# Patient Record
Sex: FEMALE | Race: OTHER | HISPANIC OR LATINO
[De-identification: names, ages, dates, MRNs, and addresses within clinical notes are randomized per-mention and may not be internally consistent; named-entity substitution may affect disease eponyms.]

---

## 2017-07-21 ENCOUNTER — APPOINTMENT (OUTPATIENT)
Dept: OBGYN | Facility: CLINIC | Age: 27
End: 2017-07-21

## 2017-07-27 ENCOUNTER — APPOINTMENT (OUTPATIENT)
Dept: INTERNAL MEDICINE | Facility: CLINIC | Age: 27
End: 2017-07-27

## 2017-08-03 ENCOUNTER — APPOINTMENT (OUTPATIENT)
Dept: OBGYN | Facility: CLINIC | Age: 27
End: 2017-08-03
Payer: COMMERCIAL

## 2017-08-03 PROCEDURE — 99395 PREV VISIT EST AGE 18-39: CPT

## 2017-08-03 PROCEDURE — 36415 COLL VENOUS BLD VENIPUNCTURE: CPT

## 2017-08-17 ENCOUNTER — APPOINTMENT (OUTPATIENT)
Dept: INTERNAL MEDICINE | Facility: CLINIC | Age: 27
End: 2017-08-17

## 2017-10-11 ENCOUNTER — LABORATORY RESULT (OUTPATIENT)
Age: 27
End: 2017-10-11

## 2017-10-12 ENCOUNTER — APPOINTMENT (OUTPATIENT)
Dept: INTERNAL MEDICINE | Facility: CLINIC | Age: 27
End: 2017-10-12
Payer: COMMERCIAL

## 2017-10-12 ENCOUNTER — FORM ENCOUNTER (OUTPATIENT)
Age: 27
End: 2017-10-12

## 2017-10-12 VITALS
OXYGEN SATURATION: 99 % | HEART RATE: 72 BPM | WEIGHT: 161 LBS | SYSTOLIC BLOOD PRESSURE: 110 MMHG | DIASTOLIC BLOOD PRESSURE: 70 MMHG | BODY MASS INDEX: 28.52 KG/M2

## 2017-10-12 DIAGNOSIS — K90.0 CELIAC DISEASE: ICD-10-CM

## 2017-10-12 PROCEDURE — 99385 PREV VISIT NEW AGE 18-39: CPT

## 2017-10-13 ENCOUNTER — APPOINTMENT (OUTPATIENT)
Dept: RADIOLOGY | Facility: CLINIC | Age: 27
End: 2017-10-13
Payer: COMMERCIAL

## 2017-10-13 ENCOUNTER — OUTPATIENT (OUTPATIENT)
Dept: OUTPATIENT SERVICES | Facility: HOSPITAL | Age: 27
LOS: 1 days | End: 2017-10-13
Payer: OTHER GOVERNMENT

## 2017-10-13 DIAGNOSIS — M79.669 PAIN IN UNSPECIFIED LOWER LEG: ICD-10-CM

## 2017-10-13 DIAGNOSIS — A60.09 HERPESVIRAL INFECTION OF OTHER UROGENITAL TRACT: ICD-10-CM

## 2017-10-13 PROCEDURE — 73590 X-RAY EXAM OF LOWER LEG: CPT | Mod: 26,50

## 2017-10-13 PROCEDURE — 73590 X-RAY EXAM OF LOWER LEG: CPT

## 2017-10-13 PROCEDURE — 73110 X-RAY EXAM OF WRIST: CPT

## 2017-10-13 PROCEDURE — 73110 X-RAY EXAM OF WRIST: CPT | Mod: 26,RT

## 2017-10-14 ENCOUNTER — TRANSCRIPTION ENCOUNTER (OUTPATIENT)
Age: 27
End: 2017-10-14

## 2017-10-16 ENCOUNTER — TRANSCRIPTION ENCOUNTER (OUTPATIENT)
Age: 27
End: 2017-10-16

## 2017-10-16 LAB
25(OH)D3 SERPL-MCNC: 15.4 NG/ML
ALBUMIN SERPL ELPH-MCNC: 4.9 G/DL
ALP BLD-CCNC: 109 U/L
ALT SERPL-CCNC: 24 U/L
ANION GAP SERPL CALC-SCNC: 16 MMOL/L
AST SERPL-CCNC: 24 U/L
BASOPHILS # BLD AUTO: 0.02 K/UL
BASOPHILS NFR BLD AUTO: 0.3 %
BILIRUB SERPL-MCNC: 0.4 MG/DL
BUN SERPL-MCNC: 10 MG/DL
CALCIUM SERPL-MCNC: 9.8 MG/DL
CHLORIDE SERPL-SCNC: 103 MMOL/L
CHOLEST SERPL-MCNC: 181 MG/DL
CHOLEST/HDLC SERPL: 3.4 RATIO
CO2 SERPL-SCNC: 23 MMOL/L
CREAT SERPL-MCNC: 0.91 MG/DL
ENDOMYSIUM IGA SER QL: NEGATIVE
ENDOMYSIUM IGA TITR SER: NORMAL
EOSINOPHIL # BLD AUTO: 0.52 K/UL
EOSINOPHIL NFR BLD AUTO: 7 %
GLIADIN IGA SER QL: 6.7 UNITS
GLIADIN IGG SER QL: 6.3 UNITS
GLIADIN PEPTIDE IGA SER-ACNC: NEGATIVE
GLIADIN PEPTIDE IGG SER-ACNC: NEGATIVE
GLUCOSE SERPL-MCNC: 84 MG/DL
HBA1C MFR BLD HPLC: 5.3 %
HCT VFR BLD CALC: 44.8 %
HDLC SERPL-MCNC: 53 MG/DL
HGB BLD-MCNC: 14.2 G/DL
HIV1+2 AB SPEC QL IA.RAPID: NONREACTIVE
HSV 1+2 IGG SER IA-IMP: POSITIVE
HSV 1+2 IGG SER IA-IMP: POSITIVE
HSV1 IGG SER QL: 41.5 INDEX
HSV2 IGG SER QL: 5.58 INDEX
IGA SER QL IEP: 248 MG/DL
IMM GRANULOCYTES NFR BLD AUTO: 0.1 %
LDLC SERPL CALC-MCNC: 95 MG/DL
LYMPHOCYTES # BLD AUTO: 1.76 K/UL
LYMPHOCYTES NFR BLD AUTO: 23.6 %
MAN DIFF?: NORMAL
MCHC RBC-ENTMCNC: 31.7 GM/DL
MCHC RBC-ENTMCNC: 32.3 PG
MCV RBC AUTO: 102.1 FL
MONOCYTES # BLD AUTO: 0.43 K/UL
MONOCYTES NFR BLD AUTO: 5.8 %
NEUTROPHILS # BLD AUTO: 4.71 K/UL
NEUTROPHILS NFR BLD AUTO: 63.2 %
PLATELET # BLD AUTO: 262 K/UL
POTASSIUM SERPL-SCNC: 4.1 MMOL/L
PROT SERPL-MCNC: 7.7 G/DL
RBC # BLD: 4.39 M/UL
RBC # FLD: 14 %
SODIUM SERPL-SCNC: 142 MMOL/L
T PALLIDUM AB SER QL IA: NEGATIVE
TRIGL SERPL-MCNC: 165 MG/DL
TSH SERPL-ACNC: 0.77 UIU/ML
TTG IGA SER IA-ACNC: 6.4 UNITS
TTG IGA SER-ACNC: NEGATIVE
TTG IGG SER IA-ACNC: <5 UNITS
TTG IGG SER IA-ACNC: NEGATIVE
WBC # FLD AUTO: 7.45 K/UL

## 2017-10-17 ENCOUNTER — TRANSCRIPTION ENCOUNTER (OUTPATIENT)
Age: 27
End: 2017-10-17

## 2017-10-23 ENCOUNTER — APPOINTMENT (OUTPATIENT)
Dept: ORTHOPEDIC SURGERY | Facility: CLINIC | Age: 27
End: 2017-10-23
Payer: COMMERCIAL

## 2017-10-23 VITALS
SYSTOLIC BLOOD PRESSURE: 101 MMHG | DIASTOLIC BLOOD PRESSURE: 66 MMHG | BODY MASS INDEX: 28.53 KG/M2 | HEIGHT: 63 IN | WEIGHT: 161 LBS | HEART RATE: 57 BPM

## 2017-10-23 DIAGNOSIS — Z78.9 OTHER SPECIFIED HEALTH STATUS: ICD-10-CM

## 2017-10-23 DIAGNOSIS — Z60.2 PROBLEMS RELATED TO LIVING ALONE: ICD-10-CM

## 2017-10-23 DIAGNOSIS — Z80.0 FAMILY HISTORY OF MALIGNANT NEOPLASM OF DIGESTIVE ORGANS: ICD-10-CM

## 2017-10-23 PROCEDURE — 99203 OFFICE O/P NEW LOW 30 MIN: CPT

## 2017-10-23 SDOH — SOCIAL STABILITY - SOCIAL INSECURITY: PROBLEMS RELATED TO LIVING ALONE: Z60.2

## 2017-11-02 ENCOUNTER — APPOINTMENT (OUTPATIENT)
Dept: MRI IMAGING | Facility: CLINIC | Age: 27
End: 2017-11-02

## 2017-11-02 ENCOUNTER — OUTPATIENT (OUTPATIENT)
Dept: OUTPATIENT SERVICES | Facility: HOSPITAL | Age: 27
LOS: 1 days | End: 2017-11-02
Payer: OTHER GOVERNMENT

## 2017-11-02 DIAGNOSIS — Z00.8 ENCOUNTER FOR OTHER GENERAL EXAMINATION: ICD-10-CM

## 2017-11-02 PROCEDURE — 73718 MRI LOWER EXTREMITY W/O DYE: CPT | Mod: 26,RT

## 2017-11-02 PROCEDURE — 73718 MRI LOWER EXTREMITY W/O DYE: CPT

## 2019-03-13 ENCOUNTER — RESULT REVIEW (OUTPATIENT)
Age: 29
End: 2019-03-13

## 2019-03-13 ENCOUNTER — APPOINTMENT (OUTPATIENT)
Dept: INTERNAL MEDICINE | Facility: CLINIC | Age: 29
End: 2019-03-13

## 2019-03-13 ENCOUNTER — APPOINTMENT (OUTPATIENT)
Dept: OBGYN | Facility: CLINIC | Age: 29
End: 2019-03-13
Payer: OTHER GOVERNMENT

## 2019-03-13 PROCEDURE — 99213 OFFICE O/P EST LOW 20 MIN: CPT | Mod: 25

## 2019-03-13 PROCEDURE — 99395 PREV VISIT EST AGE 18-39: CPT

## 2019-03-14 ENCOUNTER — APPOINTMENT (OUTPATIENT)
Dept: OBGYN | Facility: CLINIC | Age: 29
End: 2019-03-14
Payer: OTHER GOVERNMENT

## 2019-03-14 PROCEDURE — 36415 COLL VENOUS BLD VENIPUNCTURE: CPT

## 2019-03-27 ENCOUNTER — APPOINTMENT (OUTPATIENT)
Dept: INTERNAL MEDICINE | Facility: CLINIC | Age: 29
End: 2019-03-27
Payer: OTHER GOVERNMENT

## 2019-03-27 VITALS
DIASTOLIC BLOOD PRESSURE: 60 MMHG | BODY MASS INDEX: 28.88 KG/M2 | HEIGHT: 63 IN | SYSTOLIC BLOOD PRESSURE: 98 MMHG | HEART RATE: 69 BPM | OXYGEN SATURATION: 98 % | WEIGHT: 163 LBS

## 2019-03-27 PROCEDURE — G0444 DEPRESSION SCREEN ANNUAL: CPT | Mod: 59

## 2019-03-27 PROCEDURE — 99395 PREV VISIT EST AGE 18-39: CPT

## 2019-03-27 NOTE — PHYSICAL EXAM
[No Acute Distress] : no acute distress [Well-Appearing] : well-appearing [Normal Sclera/Conjunctiva] : normal sclera/conjunctiva [PERRL] : pupils equal round and reactive to light [EOMI] : extraocular movements intact [Normal Oropharynx] : the oropharynx was normal [Supple] : supple [No Lymphadenopathy] : no lymphadenopathy [Thyroid Normal, No Nodules] : the thyroid was normal and there were no nodules present [No Respiratory Distress] : no respiratory distress  [Clear to Auscultation] : lungs were clear to auscultation bilaterally [No Accessory Muscle Use] : no accessory muscle use [Normal Rate] : normal rate  [Regular Rhythm] : with a regular rhythm [Normal S1, S2] : normal S1 and S2 [No Murmur] : no murmur heard [No Edema] : there was no peripheral edema [Declined Breast Exam] : declined breast exam  [No Axillary Lymphadenopathy] : no axillary lymphadenopathy [Soft] : abdomen soft [Non Tender] : non-tender [No HSM] : no HSM [Normal Bowel Sounds] : normal bowel sounds [Normal Supraclavicular Nodes] : no supraclavicular lymphadenopathy [Normal Axillary Nodes] : no axillary lymphadenopathy [Normal Posterior Cervical Nodes] : no posterior cervical lymphadenopathy [Normal Anterior Cervical Nodes] : no anterior cervical lymphadenopathy [Normal Inguinal Nodes] : no inguinal lymphadenopathy [No Spinal Tenderness] : no spinal tenderness [No Joint Swelling] : no joint swelling [No Focal Deficits] : no focal deficits [Speech Grossly Normal] : speech grossly normal [Normal Affect] : the affect was normal [Alert and Oriented x3] : oriented to person, place, and time [Normal Mood] : the mood was normal [Normal Insight/Judgement] : insight and judgment were intact [Acne] : no acne [de-identified] : No calf tenderness bilaterally. Radial pulses +2 bilaterally  [de-identified] : pt states she will see her GYN for her breast symptoms

## 2019-03-27 NOTE — ASSESSMENT
[FreeTextEntry1] : HCM - preventive topics d/w pt\par -check screening labs\par \par Advised the patient today to return to the office within 12 months or sooner as needed for the next visit and that the patient should see any doctor here at this office for ongoing care

## 2019-03-27 NOTE — REVIEW OF SYSTEMS
[Fever] : no fever [Chills] : no chills [Pain] : no pain [Redness] : no redness [Sore Throat] : no sore throat [Chest Pain] : no chest pain [Shortness Of Breath] : no shortness of breath [Abdominal Pain] : no abdominal pain [Melena] : no melena [Dysuria] : no dysuria [Hematuria] : no hematuria [Joint Pain] : no joint pain [Skin Rash] : no skin rash [Dizziness] : no dizziness [Fainting] : no fainting [Depression] : no depression [Easy Bleeding] : no easy bleeding [Easy Bruising] : no easy bruising [FreeTextEntry8] : no abnormal vaginal bleeding [de-identified] : no breast pain but pt reported milky nipple discharge - she saw her GYN for it and they are doing an ultrasound and bloodwork was "normal" per pt for it - advised pt to do the testing and f/u with her GYN For it

## 2019-03-27 NOTE — HEALTH RISK ASSESSMENT
[No falls in past year] : Patient reported no falls in the past year [0] : 2) Feeling down, depressed, or hopeless: Not at all (0) [Patient reported PAP Smear was normal] : Patient reported PAP Smear was normal [HIV Test offered] : HIV Test offered [] :  [Sexually Active] : sexually active [Feels Safe at Home] : Feels safe at home [Fully functional (bathing, dressing, toileting, transferring, walking, feeding)] : Fully functional (bathing, dressing, toileting, transferring, walking, feeding) [Fully functional (using the telephone, shopping, preparing meals, housekeeping, doing laundry, using] : Fully functional and needs no help or supervision to perform IADLs (using the telephone, shopping, preparing meals, housekeeping, doing laundry, using transportation, managing medications and managing finances) [Smoke Detector] : smoke detector [Carbon Monoxide Detector] : carbon monoxide detector [Seat Belt] :  uses seat belt [Sunscreen] : uses sunscreen [] : No [de-identified] : social [[id number]] : 0 [Reports changes in hearing] : Reports no changes in hearing [Reports changes in vision] : Reports no changes in vision [Reports changes in dental health] : Reports no changes in dental health [PapSmearDate] : 03/19 [PapSmearComments] : pt reports she sees her GYN regularly [de-identified] : 1 female partner [de-identified] : advised routine dental followup

## 2019-03-27 NOTE — HISTORY OF PRESENT ILLNESS
[FreeTextEntry1] : CPE [de-identified] : \par Pt reports feeling well overall.  Pt was deployed to [facility] - she is in the  and recently returned this past February. No major issues/incidents occurred per the pt.

## 2019-03-27 NOTE — COUNSELING
[Healthy eating counseling provided] : healthy eating [Activity counseling provided] : activity [None] : None [Good understanding] : Patient has a good understanding of lifestyle changes and the steps needed to achieve self management goals [ - Annual Depression Screening] : Annual Depression Screening [de-identified] : pt counseled on eating a healthy diet, exercise, avoidance of tobacco and alcohol, avoidance of UV light/sunscreen use, safe sex habits/STI prevention, pt agreeable to HIV/STI screening which was offered today, pt counseled to alert us or GYN immediately if she ever has abnormal vaginal bleeding\par \par HCM - preventive topics d/w pt. pt agreeable to HIV screening which was offered today. \par \par Pt advised to call us/come here for a sick visit/or go to urgent care if she is ill. pt advised to go to ED if emergent issues. pt is to call us to discuss results of all lab testing and to discuss any appropriate followup. Pt advised to alert us if she does not receive a phone call or letter with lab test results within 10 days  of today's visit.\par

## 2019-07-16 ENCOUNTER — TRANSCRIPTION ENCOUNTER (OUTPATIENT)
Age: 29
End: 2019-07-16

## 2019-08-22 ENCOUNTER — APPOINTMENT (OUTPATIENT)
Dept: INTERNAL MEDICINE | Facility: CLINIC | Age: 29
End: 2019-08-22
Payer: OTHER GOVERNMENT

## 2019-08-22 VITALS
DIASTOLIC BLOOD PRESSURE: 60 MMHG | SYSTOLIC BLOOD PRESSURE: 100 MMHG | HEIGHT: 63 IN | OXYGEN SATURATION: 97 % | HEART RATE: 62 BPM | BODY MASS INDEX: 30.48 KG/M2 | WEIGHT: 172 LBS

## 2019-08-22 PROCEDURE — 99214 OFFICE O/P EST MOD 30 MIN: CPT

## 2019-08-22 NOTE — PHYSICAL EXAM
[Normal] : normal rate, regular rhythm, normal S1 and S2 and no murmur heard [Normal Posterior Cervical Nodes] : no posterior cervical lymphadenopathy [No CVA Tenderness] : no CVA  tenderness [Normal Anterior Cervical Nodes] : no anterior cervical lymphadenopathy [No Rash] : no rash [No Joint Swelling] : no joint swelling [No Skin Lesions] : no skin lesions [de-identified] : no lesions currently present

## 2019-08-22 NOTE — HISTORY OF PRESENT ILLNESS
[FreeTextEntry1] : rash [de-identified] : Acute visit for patient previously see Dr Elliott.  [place] in a rash - went to local hospital and was treated with steroid.\par She back home, she has had several episodes.  thinks may be triggered by dairy. rash hive-like, will go away with Benadryl - 50 mg - but the Benadryl makes her really tired. If she doesn’t take it, the rash lasts all day. No lip or facial swelling (but does get hives on her face), no itching in her throat, no SOB, no joint pains, no fever. \par Rash not currently present but she has multiple pictures on her phone of what it looks like. \par \par Also c/o weight gain and would like to see a nutritionist

## 2019-09-06 ENCOUNTER — APPOINTMENT (OUTPATIENT)
Dept: INTERNAL MEDICINE | Facility: CLINIC | Age: 29
End: 2019-09-06
Payer: OTHER GOVERNMENT

## 2019-09-06 PROCEDURE — 97802 MEDICAL NUTRITION INDIV IN: CPT

## 2019-09-13 ENCOUNTER — APPOINTMENT (OUTPATIENT)
Dept: INTERNAL MEDICINE | Facility: CLINIC | Age: 29
End: 2019-09-13
Payer: OTHER GOVERNMENT

## 2019-09-13 PROCEDURE — 97803 MED NUTRITION INDIV SUBSEQ: CPT

## 2019-09-26 ENCOUNTER — APPOINTMENT (OUTPATIENT)
Dept: PEDIATRIC ALLERGY IMMUNOLOGY | Facility: CLINIC | Age: 29
End: 2019-09-26
Payer: OTHER GOVERNMENT

## 2019-09-26 ENCOUNTER — APPOINTMENT (OUTPATIENT)
Dept: INTERNAL MEDICINE | Facility: CLINIC | Age: 29
End: 2019-09-26
Payer: OTHER GOVERNMENT

## 2019-09-26 VITALS
HEIGHT: 63 IN | SYSTOLIC BLOOD PRESSURE: 98 MMHG | OXYGEN SATURATION: 98 % | WEIGHT: 175 LBS | HEART RATE: 54 BPM | BODY MASS INDEX: 31.01 KG/M2 | DIASTOLIC BLOOD PRESSURE: 65 MMHG

## 2019-09-26 DIAGNOSIS — J30.81 ALLERGIC RHINITIS DUE TO ANIMAL (CAT) (DOG) HAIR AND DANDER: ICD-10-CM

## 2019-09-26 DIAGNOSIS — J30.89 OTHER ALLERGIC RHINITIS: ICD-10-CM

## 2019-09-26 DIAGNOSIS — H10.10 ACUTE ATOPIC CONJUNCTIVITIS, UNSPECIFIED EYE: ICD-10-CM

## 2019-09-26 DIAGNOSIS — K90.49 MALABSORPTION DUE TO INTOLERANCE, NOT ELSEWHERE CLASSIFIED: ICD-10-CM

## 2019-09-26 PROCEDURE — 95004 PERQ TESTS W/ALRGNC XTRCS: CPT

## 2019-09-26 PROCEDURE — 97803 MED NUTRITION INDIV SUBSEQ: CPT

## 2019-09-26 PROCEDURE — 99204 OFFICE O/P NEW MOD 45 MIN: CPT | Mod: 25

## 2019-09-26 NOTE — SOCIAL HISTORY
[Living Area] : in living area [Bedroom] :  in bedroom [Other___] : [unfilled] [de-identified] : son, daughter [FreeTextEntry2] : in  [Smokers in Household] : there are no smokers in the home

## 2019-09-26 NOTE — PHYSICAL EXAM
[Alert] : alert [Well Nourished] : well nourished [Healthy Appearance] : healthy appearance [No Acute Distress] : no acute distress [Well Developed] : well developed [Normal Pupil & Iris Size/Symmetry] : normal pupil and iris size and symmetry [No Discharge] : no discharge [No Photophobia] : no photophobia [Sclera Not Icteric] : sclera not icteric [Suborbital Bogginess] : suborbital bogginess (allergic shiners) [Normal TMs] : both tympanic membranes were normal [Normal Lips/Tongue] : the lips and tongue were normal [Normal Nasal Mucosa] : the nasal mucosa was normal [Normal Outer Ear/Nose] : the ears and nose were normal in appearance [Normal Tonsils] : normal tonsils [No Thrush] : no thrush [No Oral Lesions or Ulcers] : no oral lesions or ulcers [Normal Dentition] : normal dentition [Boggy Nasal Turbinates] : boggy and/or pale nasal turbinates [No Neck Mass] : no neck mass was observed [No LAD] : no lymphadenopathy [Supple] : the neck was supple [Normal Rate and Effort] : normal respiratory rhythm and effort [No Crackles] : no crackles [No Retractions] : no retractions [Bilateral Audible Breath Sounds] : bilateral audible breath sounds [Normal Rate] : heart rate was normal  [Normal S1, S2] : normal S1 and S2 [No murmur] : no murmur [Regular Rhythm] : with a regular rhythm [Soft] : abdomen soft [Not Tender] : non-tender [Not Distended] : not distended [Normal Cervical Lymph Nodes] : cervical [No HSM] : no hepato-splenomegaly [Normal Axillary Lumph Nodes] : axillary [Skin Intact] : skin intact  [No Rash] : no rash [No Skin Lesions] : no skin lesions [No Joint Swelling or Erythema] : no joint swelling or erythema [No Edema] : no edema [No clubbing] : no clubbing [No Cyanosis] : no cyanosis [Normal Mood] : mood was normal [Normal Affect] : affect was normal [Alert, Awake, Oriented as Age-Appropriate] : alert, awake, oriented as age appropriate [Conjunctival Erythema] : no conjunctival erythema [Pharyngeal erythema] : no pharyngeal erythema [Clear Rhinorrhea] : no clear rhinorrhea was seen [Wheezing] : no wheezing was heard [Eczematous Patches] : no eczematous patches [Xerosis] : no xerosis

## 2019-09-26 NOTE — REVIEW OF SYSTEMS
[Eye Itching] : itchy eyes [Rhinorrhea] : rhinorrhea [Post Nasal Drip] : post nasal drip [Urticaria] : urticaria [Nl] : Genitourinary [Immunizations are up to date] : Immunizations are up to date

## 2019-09-26 NOTE — CONSULT LETTER
[Consult Letter:] : I had the pleasure of evaluating your patient, [unfilled]. [Please see my note below.] : Please see my note below. [Dear  ___] : Dear  [unfilled], [Consult Closing:] : Thank you very much for allowing me to participate in the care of this patient.  If you have any questions, please do not hesitate to contact me. [Sincerely,] : Sincerely, [FreeTextEntry3] : Rosina Henson MD\par Attending Physician, Division of Allergy and Immunology\par , Departments of Medicine and Pediatrics\par [employer] [facility]\par [facility] \par [facility]  [FreeTextEntry2] : Dr. Brit Casey,

## 2019-09-26 NOTE — IMPRESSION
[Allergy Testing Dust Mite] : dust mites [Allergy Testing Cat] : cat [Allergy Testing Mixed Feathers] : feathers [Allergy Testing Cockroach] : cockroach [Allergy Testing Dog] : dog [] : molds [Allergy Testing Trees] : trees [Allergy Testing Weeds] : weeds [Allergy Testing Grasses] : grasses [________] : [unfilled]

## 2019-09-26 NOTE — REASON FOR VISIT
[Initial Consultation] : an initial consultation for [FreeTextEntry2] : urticaria, chronic rhinitis, postnasal drip, itchy eyes

## 2019-09-26 NOTE — HISTORY OF PRESENT ILLNESS
[Asthma] : asthma [Eczematous rashes] : eczematous rashes [Venom Reactions] : venom reactions [de-identified] : 28 year old female presents in initial consultation for chronic rhinitis, postnasal drip, itchy eyes and urticaria history:\par \par Patient was getting wheals end of July until 2 weeks ago. She was having urticaria on a daily basis until it resolved 2 weeks ago. Urticaria was lessening in intensity before it resolved. Benadryl provided temporary relief of the itch. Additionally she also received steroids at the end of July. She had not been treated with steroids since the end of July.\par 2 weeks prior to symptom onset patient had a UTI, and finished a course of Abx one week prior.\par Urticaria was not associated with joint pain/swelling, fever or mucosal lesions. \par \par The patient tolerates lactose free cow' milk/dairy, egg, wheat, peanut, tree nuts, fish and shellfish with no adverse reaction. Reports h/o bloating and upset stomach with soy and regular milk. Denies associated symptoms of angioedema, hives, or respiratory symptoms with soy or milk.\par \par Patient reports symptoms of nasal congestion, rhinorrhea, sneezing and itching of the eyes. Symptoms are reportedly worse when she cleans her home. Patient takes Zyrtec as needed with some relief of symptoms. She also has eye drops and nose sprays available. \par \par Patient reports h/o vomiting with Percocet and Vicodin on separate occasions. Tolerates Tylenol with no issues.

## 2019-10-17 ENCOUNTER — APPOINTMENT (OUTPATIENT)
Dept: INTERNAL MEDICINE | Facility: CLINIC | Age: 29
End: 2019-10-17

## 2019-10-24 ENCOUNTER — FORM ENCOUNTER (OUTPATIENT)
Age: 29
End: 2019-10-24

## 2019-10-25 ENCOUNTER — APPOINTMENT (OUTPATIENT)
Dept: OBGYN | Facility: CLINIC | Age: 29
End: 2019-10-25
Payer: OTHER GOVERNMENT

## 2019-10-25 PROCEDURE — 99213 OFFICE O/P EST LOW 20 MIN: CPT

## 2019-10-25 PROCEDURE — 36415 COLL VENOUS BLD VENIPUNCTURE: CPT

## 2019-10-27 ENCOUNTER — FORM ENCOUNTER (OUTPATIENT)
Age: 29
End: 2019-10-27

## 2019-10-29 ENCOUNTER — FORM ENCOUNTER (OUTPATIENT)
Age: 29
End: 2019-10-29

## 2019-11-08 ENCOUNTER — APPOINTMENT (OUTPATIENT)
Dept: OBGYN | Facility: CLINIC | Age: 29
End: 2019-11-08

## 2019-11-20 ENCOUNTER — FORM ENCOUNTER (OUTPATIENT)
Age: 29
End: 2019-11-20

## 2020-01-02 ENCOUNTER — TRANSCRIPTION ENCOUNTER (OUTPATIENT)
Age: 30
End: 2020-01-02

## 2020-05-12 ENCOUNTER — TRANSCRIPTION ENCOUNTER (OUTPATIENT)
Age: 30
End: 2020-05-12

## 2020-05-12 ENCOUNTER — APPOINTMENT (OUTPATIENT)
Dept: INTERNAL MEDICINE | Facility: CLINIC | Age: 30
End: 2020-05-12
Payer: OTHER GOVERNMENT

## 2020-05-12 VITALS — WEIGHT: 173 LBS | BODY MASS INDEX: 30.65 KG/M2

## 2020-05-12 PROCEDURE — 99213 OFFICE O/P EST LOW 20 MIN: CPT | Mod: 95

## 2020-05-12 RX ORDER — CETIRIZINE HYDROCHLORIDE 10 MG/1
10 TABLET, COATED ORAL
Qty: 1 | Refills: 1 | Status: DISCONTINUED | COMMUNITY
End: 2020-05-12

## 2020-05-12 RX ORDER — KETOTIFEN FUMARATE 0.25 MG/ML
0.03 SOLUTION/ DROPS OPHTHALMIC
Qty: 1 | Refills: 3 | Status: DISCONTINUED | COMMUNITY
Start: 2019-09-26 | End: 2020-05-12

## 2020-05-12 RX ORDER — FLUTICASONE PROPIONATE 50 UG/1
50 SPRAY, METERED NASAL DAILY
Qty: 1 | Refills: 2 | Status: DISCONTINUED | COMMUNITY
End: 2020-05-12

## 2020-05-13 ENCOUNTER — TRANSCRIPTION ENCOUNTER (OUTPATIENT)
Age: 30
End: 2020-05-13

## 2020-05-14 ENCOUNTER — TRANSCRIPTION ENCOUNTER (OUTPATIENT)
Age: 30
End: 2020-05-14

## 2020-05-28 LAB
17OHP SERPL-MCNC: 51 NG/DL
25(OH)D3 SERPL-MCNC: 18.6 NG/ML
ALBUMIN SERPL ELPH-MCNC: 4.7 G/DL
ALP BLD-CCNC: 104 U/L
ALT SERPL-CCNC: 14 U/L
ANION GAP SERPL CALC-SCNC: 13 MMOL/L
AST SERPL-CCNC: 23 U/L
BASOPHILS # BLD AUTO: 0.02 K/UL
BASOPHILS NFR BLD AUTO: 0.3 %
BILIRUB SERPL-MCNC: 0.4 MG/DL
BUN SERPL-MCNC: 21 MG/DL
CALCIUM SERPL-MCNC: 9.9 MG/DL
CHLORIDE SERPL-SCNC: 104 MMOL/L
CHOLEST SERPL-MCNC: 193 MG/DL
CHOLEST/HDLC SERPL: 3.1 RATIO
CO2 SERPL-SCNC: 24 MMOL/L
CREAT SERPL-MCNC: 0.94 MG/DL
DHEA-SULFATE, SERUM: 86 UG/DL
EOSINOPHIL # BLD AUTO: 0.18 K/UL
EOSINOPHIL NFR BLD AUTO: 2.9 %
ESTRADIOL SERPL-MCNC: 31 PG/ML
FSH SERPL-MCNC: 9.4 IU/L
GLUCOSE SERPL-MCNC: 88 MG/DL
HCT VFR BLD CALC: 40.8 %
HDLC SERPL-MCNC: 62 MG/DL
HGB BLD-MCNC: 13.2 G/DL
IMM GRANULOCYTES NFR BLD AUTO: 0.2 %
LDLC SERPL CALC-MCNC: 113 MG/DL
LH SERPL-ACNC: 52.8 IU/L
LYMPHOCYTES # BLD AUTO: 2.43 K/UL
LYMPHOCYTES NFR BLD AUTO: 39.4 %
MAN DIFF?: NORMAL
MCHC RBC-ENTMCNC: 31.7 PG
MCHC RBC-ENTMCNC: 32.4 GM/DL
MCV RBC AUTO: 98.1 FL
MONOCYTES # BLD AUTO: 0.65 K/UL
MONOCYTES NFR BLD AUTO: 10.6 %
NEUTROPHILS # BLD AUTO: 2.87 K/UL
NEUTROPHILS NFR BLD AUTO: 46.6 %
PLATELET # BLD AUTO: 258 K/UL
POTASSIUM SERPL-SCNC: 4.4 MMOL/L
PROLACTIN SERPL-MCNC: 17.9 NG/ML
PROT SERPL-MCNC: 6.9 G/DL
RBC # BLD: 4.16 M/UL
RBC # FLD: 13.2 %
SODIUM SERPL-SCNC: 141 MMOL/L
TRIGL SERPL-MCNC: 91 MG/DL
TSH SERPL-ACNC: 1.7 UIU/ML
WBC # FLD AUTO: 6.16 K/UL

## 2020-05-28 NOTE — ASSESSMENT
[FreeTextEntry1] : 30 yo female with h/o as above including anxiety/depression here for telehealth visit for several hormonal symptoms, including galactorrhea, amenorrhea, hirsutism, weight gain, HA.  Will test below hormonal tests, pending results may consider endocrine eval.  Check routine labs as well.  Further plan pending results.  Will f/up with psych as well for anxiety/depression.

## 2020-05-28 NOTE — REVIEW OF SYSTEMS
[Fatigue] : fatigue [Headache] : headache [Negative] : Heme/Lymph [FreeTextEntry8] : see hpi [de-identified] : see hpi

## 2020-05-28 NOTE — HISTORY OF PRESENT ILLNESS
[Other Location: e.g. Home (Enter Location, City,State)___] : at [unfilled] [Home] : at home, [unfilled] , at the time of the visit. [Patient] : the patient [Self] : self [FreeTextEntry8] : 28 yo female with h/o as below here to get testing on her thyroid.\olaf Recently reached out for mental health through 's program, they told her that a lot of her symptoms could be due to her thyroid and recommended that she get screened before they deliver care.\olaf Back in September/October, started to have menses straight for 2 months (October until end of November), went to obgyn, gave her some birth control, advised to take for a week, and hasn't had menses from December on.  Having a lot of anxiety and depression over the past year or two, hair growth abnl, gained a lot of weight over the past year, originally thought was normal but now realizes not normal, gained 20 lbs in 3-4 months, having night sweats for 3 years, waking up drenched in sweat top half of body, trying not to sleep with shirt on or has to leave windows open.  Will occur every night, if not, then most of the week.  Has a lot of hair growth on nipples, stomach, chin.  Also has had b/l nipple discharge for months, almost as if producing milk for a child, when it first comes out will be a little green, but then will be white like milk.  No fevers/chills, +fatigue, always tired, doesn't sleep well at all.  Doesn't sleep well as anxious or just can't get to sleep.  Recently has had HA, not sure if due to being home all the time but wakes up with a HA every day.   HA in front of forehead, as if really tense.  No vision changes.  In the past 2 years, had 2 brief episodes where there was a cover over pupil, couldn't see in the center as well but could see around it, but came and went after an hour.  Took pregnancy test and was negative, also has female partner so no possible way that she is pregnant.  Not currently on any hormonal treatment.  Had breast sonogram previously for nipple discharge and was told was fine.\olaf Mental health has been bad.  Doesn't have much of an appetite, limiting how much she is eating.  No S/I.  Will f/up with mental health program.\olaf Had sonogram of uterus and cervix and said everything was clear and was still ovulating.  Never had any of these symptoms before.  Not taking any other medications.\par No other active issues.

## 2020-05-28 NOTE — ADDENDUM
[FreeTextEntry1] : 5/28/20:  Reviewed labs with pt, nl except vitamin D 18.6 c/w insufficiency (to take supplement), but given above symptoms should still see endocrine for further w/up, reached out to physician access center to assist with referral.

## 2020-05-28 NOTE — PHYSICAL EXAM
[No Acute Distress] : no acute distress [Well Developed] : well developed [Well Nourished] : well nourished [Normal Sclera/Conjunctiva] : normal sclera/conjunctiva [Well-Appearing] : well-appearing [EOMI] : extraocular movements intact [No Respiratory Distress] : no respiratory distress  [Supple] : supple [No Rash] : no rash [No Accessory Muscle Use] : no accessory muscle use [Normal Affect] : the affect was normal [No Focal Deficits] : no focal deficits [de-identified] : no obvious significant thyromegaly

## 2020-06-10 ENCOUNTER — TRANSCRIPTION ENCOUNTER (OUTPATIENT)
Age: 30
End: 2020-06-10

## 2020-06-16 ENCOUNTER — APPOINTMENT (OUTPATIENT)
Dept: ENDOCRINOLOGY | Facility: CLINIC | Age: 30
End: 2020-06-16
Payer: COMMERCIAL

## 2020-06-16 VITALS
SYSTOLIC BLOOD PRESSURE: 104 MMHG | TEMPERATURE: 97.8 F | DIASTOLIC BLOOD PRESSURE: 68 MMHG | HEART RATE: 64 BPM | WEIGHT: 175 LBS | BODY MASS INDEX: 29.88 KG/M2 | HEIGHT: 64 IN | OXYGEN SATURATION: 97 %

## 2020-06-16 LAB — TESTOST SERPL-MCNC: 5.7 NG/DL

## 2020-06-16 PROCEDURE — 99243 OFF/OP CNSLTJ NEW/EST LOW 30: CPT | Mod: GC

## 2020-06-16 NOTE — ASSESSMENT
[FreeTextEntry1] : 28 y/o female with no PMHx presenting for evaluation of secondary amenorrhea. \par \par #Secondary Amenorrhea\par -Meets 2/3 [place] Criteria for PCOS: irregular menses and clinical symptoms of hyperandrogenism (hirsutism)\par -Hormonal workup grossly normal: TSH 1.7; Estradiol 31; Testosterone 5.7 (L); Prolactin 17.9; FSH 9.4; LH 52.8; 17-hyderoxyprogesterone 51; DHEA 86\par -check B-Hcg, IGF-1, and baseline HgbA1c\par -check cortisol - patient to perform dexamethasone suppression test; Dexamethasone 1 mg sent to pharmacy \par -repeat prolactin level given significant galactorrhea expressed on exam\par -discussed initiation of birth control if all workup negative and PCOS diagnosed; patient wants to avoid birth control as she is trying to get pregnant. Will discuss other options, including possibly Metformin, depending on remainder of workup. \par -Regular follow up with OBGYN\par \par Galactorrhea: recheck PRL.\par

## 2020-06-16 NOTE — REVIEW OF SYSTEMS
[Fatigue] : fatigue [Recent Weight Gain (___ Lbs)] : recent weight gain: [unfilled] lbs [Constipation] : constipation [Polyuria] : polyuria [Nocturia] : nocturia [Irregular Menses] : irregular menses [Heat Intolerance] : heat intolerance [Stress] : stress [Easy Bruising] : a tendency for easy bruising [Hot Flashes] : hot flashes [Galactorrhea] : galactorrhea  [Negative] : Heme/Lymph [Fever] : no fever [Decreased Appetite] : appetite not decreased [Nausea] : no nausea [Chills] : no chills [Vomiting] : no vomiting [Gas/Bloating] : no gas/bloating [Abdominal Pain] : no abdominal pain [Pelvic Pain] : no pelvic pain [Dysuria] : no dysuria [Vaginal Discharge] : no vaginal discharge [Incontinence] : no incontinence [de-identified] : mood changes, short temper

## 2020-06-16 NOTE — HISTORY OF PRESENT ILLNESS
[FreeTextEntry1] : 28 y/o female with no PMHx presenting for evaluation of secondary amenorrhea. \par \par In Oct 2019: patient was menstruating for 6 weeks. She went to OBGYN and had Transvaginal U/S. Was told she had one cyst on right ovary and was ovulating at the time. Took birth control for 1 week, with some spotting through December. After that, she stopped having periods, or any spotting. Last date of spotting late Dec. \par \par  Other Positive symptoms: \par (+) 1 yr of:  galactorrhea b/l, worse on right. Had breast U/S with OBGYN, all normal; inc facial hair on side of face and stomach, always had facial hair on chin and neck; 30lb unintentional weight gain\par \par (+) 6 months of loss of sex drive (Patient's biggest concern); severe constipation; nocturia 2x/night; polydypsia; night sweats; occasional hot flashes and heat intolerance; vaginal dryness; mood swings, short tempered; easier bruising; fatigue; occasional headaches \par \par Patient has never had any symptoms like this before. \par \par Denies changes in vision, hair loss, palpitations, chest pain, abdominal pain, diarrhea, hair loss, bleeding\par \par Menarche age 9, always regular periods. \par Sexually active with 1 female partner, wife. No other partners. \par \par No hx of infertility issues: Has 2 children (age 10 and 8), vaginal delivery \par \par Diet higher on carbs b/c of increased rice. 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

## 2020-06-16 NOTE — PHYSICAL EXAM
[Alert] : alert [Well Nourished] : well nourished [No Acute Distress] : no acute distress [Well Developed] : well developed [Normal Sclera/Conjunctiva] : normal sclera/conjunctiva [EOMI] : extra ocular movement intact [No Proptosis] : no proptosis [Normal Oropharynx] : the oropharynx was normal [Thyroid Not Enlarged] : the thyroid was not enlarged [No Thyroid Nodules] : no palpable thyroid nodules [No Respiratory Distress] : no respiratory distress [No Accessory Muscle Use] : no accessory muscle use [Clear to Auscultation] : lungs were clear to auscultation bilaterally [Normal S1, S2] : normal S1 and S2 [Normal Rate] : heart rate was normal [Regular Rhythm] : with a regular rhythm [No Edema] : no peripheral edema [Pedal Pulses Normal] : the pedal pulses are present [Normal Bowel Sounds] : normal bowel sounds [Not Tender] : non-tender [Not Distended] : not distended [Soft] : abdomen soft [Normal Anterior Cervical Nodes] : no anterior cervical lymphadenopathy [No Spinal Tenderness] : no spinal tenderness [Normal Posterior Cervical Nodes] : no posterior cervical lymphadenopathy [Spine Straight] : spine straight [No Stigmata of Cushings Syndrome] : no stigmata of Cushings Syndrome [Normal Gait] : normal gait [Normal Strength/Tone] : muscle strength and tone were normal [Acanthosis Nigricans] : acanthosis nigricans present [Hirsutism] : hirsutism present [Normal Reflexes] : deep tendon reflexes were 2+ and symmetric [No Tremors] : no tremors [Oriented x3] : oriented to person, place, and time [de-identified] : galactorrhea

## 2020-06-23 ENCOUNTER — TRANSCRIPTION ENCOUNTER (OUTPATIENT)
Age: 30
End: 2020-06-23

## 2020-06-24 LAB
CORTIS SERPL-MCNC: 0.5 UG/DL
CORTIS SERPL-MCNC: 6.2 UG/DL
ESTIMATED AVERAGE GLUCOSE: 108 MG/DL
HBA1C MFR BLD HPLC: 5.4 %
HCG SERPL-MCNC: 1 MIU/ML
IGF-1 INTERP: NORMAL
IGF-I BLD-MCNC: 283 NG/ML
PROLACTIN SERPL-MCNC: 10.8 NG/ML
PROLACTIN SERPL-MCNC: 9.6 NG/ML

## 2020-08-07 ENCOUNTER — APPOINTMENT (OUTPATIENT)
Dept: INTERNAL MEDICINE | Facility: CLINIC | Age: 30
End: 2020-08-07
Payer: COMMERCIAL

## 2020-08-07 VITALS
BODY MASS INDEX: 32.1 KG/M2 | HEIGHT: 64 IN | SYSTOLIC BLOOD PRESSURE: 90 MMHG | DIASTOLIC BLOOD PRESSURE: 60 MMHG | OXYGEN SATURATION: 98 % | HEART RATE: 84 BPM | WEIGHT: 188 LBS

## 2020-08-07 PROCEDURE — 99214 OFFICE O/P EST MOD 30 MIN: CPT

## 2020-08-07 RX ORDER — DEXAMETHASONE 1 MG/1
1 TABLET ORAL
Qty: 1 | Refills: 0 | Status: DISCONTINUED | COMMUNITY
Start: 2020-06-16 | End: 2020-08-07

## 2020-08-12 LAB
APPEARANCE: CLEAR
BILIRUBIN URINE: NEGATIVE
BLOOD URINE: NEGATIVE
COLOR: NORMAL
GLUCOSE QUALITATIVE U: NEGATIVE
HBV SURFACE AB SER QL: REACTIVE
HEPATITIS A IGG ANTIBODY: REACTIVE
KETONES URINE: NEGATIVE
LEUKOCYTE ESTERASE URINE: NEGATIVE
M TB IFN-G BLD-IMP: NEGATIVE
MEV IGG FLD QL IA: >300 AU/ML
MEV IGG+IGM SER-IMP: POSITIVE
MUV AB SER-ACNC: POSITIVE
MUV IGG SER QL IA: 160 AU/ML
NITRITE URINE: NEGATIVE
PH URINE: 6
PROTEIN URINE: NEGATIVE
QUANTIFERON TB PLUS MITOGEN MINUS NIL: 7.76 IU/ML
QUANTIFERON TB PLUS NIL: 0.01 IU/ML
QUANTIFERON TB PLUS TB1 MINUS NIL: 0 IU/ML
QUANTIFERON TB PLUS TB2 MINUS NIL: 0 IU/ML
RUBV IGG FLD-ACNC: 3.7 INDEX
RUBV IGG SER-IMP: POSITIVE
SPECIFIC GRAVITY URINE: 1.02
UROBILINOGEN URINE: NORMAL
VZV AB TITR SER: POSITIVE
VZV IGG SER IF-ACNC: 1412 INDEX

## 2020-08-13 ENCOUNTER — OUTPATIENT (OUTPATIENT)
Dept: OUTPATIENT SERVICES | Facility: HOSPITAL | Age: 30
LOS: 1 days | End: 2020-08-13
Payer: COMMERCIAL

## 2020-08-13 ENCOUNTER — APPOINTMENT (OUTPATIENT)
Dept: MAMMOGRAPHY | Facility: CLINIC | Age: 30
End: 2020-08-13

## 2020-08-13 ENCOUNTER — APPOINTMENT (OUTPATIENT)
Dept: ULTRASOUND IMAGING | Facility: CLINIC | Age: 30
End: 2020-08-13

## 2020-08-13 PROCEDURE — 77066 DX MAMMO INCL CAD BI: CPT | Mod: 26

## 2020-08-13 PROCEDURE — 76641 ULTRASOUND BREAST COMPLETE: CPT | Mod: 26,50

## 2020-08-13 PROCEDURE — G0279: CPT | Mod: 26

## 2020-08-17 ENCOUNTER — TRANSCRIPTION ENCOUNTER (OUTPATIENT)
Age: 30
End: 2020-08-17

## 2020-08-17 NOTE — ADDENDUM
[FreeTextEntry1] : 8/12/20:  Labs nl - forms filled out.\par 8/17/20:  Mammo/breast sono birads 1 - messaged pt.

## 2020-08-17 NOTE — HISTORY OF PRESENT ILLNESS
[de-identified] : 30 yo female with h/o as below here because needs physical done for school.  Feeling well, no complaints.  \par Weight is bothering her.  Saw endocrine, said has PCOS, taking metformin now, hasn't noticed a change yet.  Still hasn't had menses.  Will see gyn September 8th.  Also had seen fertility specialist as was thinking of conceiving with wife, said had elevated LH levels, had brain MRI and was nl.  May hold off on conceiving due to weight issues.  Had seen nutritionist without improvement in weight, really bothering her.\par Notes mood has improved, has been going through therapy, also not sure if metformin assisting with it.  No S/I.  \par Having lower back pain which she believes is due to her weight.\par Still having galactorrhea, will be able to express it if sitting up.  Had nl prolactin levels but still having it and not sure cause.  Hasn't had breast imaging (had sono years ago for mass that was found to be muscle).  Notes breasts increased one cup size in last year, also had gained weight in last year.\par Will f/up soon with gyn and will get pap.\par Needs form filled out for school.\par No other active issues.\par

## 2020-08-17 NOTE — ASSESSMENT
[FreeTextEntry1] : 28 yo female with h/o as above including chronic galactorrhea, secondary amenorrhea with recently diagnosed PCOS, anxiety/depression, here for f/up visit and to address a few issues.\par 1.  Gyn - will see gyn for pap and further discussion of PCOS and secondary amenorrhea, already on metformin; rx mammo/breast sono given for b/l galactorrhea and masses left upper breast\par 2.  Endo - referred to weight management for obesity; recent labs nl\par 3.  Psych - anxiety/depression improved with therapy\par 4.  HCM - check below labs for school form, pt to get immunization records\par 5.  RTO prn

## 2020-08-17 NOTE — PHYSICAL EXAM
[No Acute Distress] : no acute distress [Well Nourished] : well nourished [Well-Appearing] : well-appearing [Well Developed] : well developed [PERRL] : pupils equal round and reactive to light [Normal Oropharynx] : the oropharynx was normal [Normal TMs] : both tympanic membranes were normal [No Lymphadenopathy] : no lymphadenopathy [Supple] : supple [No Respiratory Distress] : no respiratory distress  [Thyroid Normal, No Nodules] : the thyroid was normal and there were no nodules present [No Accessory Muscle Use] : no accessory muscle use [Normal Rate] : normal rate  [Clear to Auscultation] : lungs were clear to auscultation bilaterally [Normal S1, S2] : normal S1 and S2 [Regular Rhythm] : with a regular rhythm [No Murmur] : no murmur heard [No Carotid Bruits] : no carotid bruits [No Edema] : there was no peripheral edema [Pedal Pulses Present] : the pedal pulses are present [Normal Appearance] : normal in appearance [No Axillary Lymphadenopathy] : no axillary lymphadenopathy [Soft] : abdomen soft [Non-distended] : non-distended [Non Tender] : non-tender [No Masses] : no abdominal mass palpated [No HSM] : no HSM [Normal Bowel Sounds] : normal bowel sounds [Normal Axillary Nodes] : no axillary lymphadenopathy [Normal Supraclavicular Nodes] : no supraclavicular lymphadenopathy [Normal Posterior Cervical Nodes] : no posterior cervical lymphadenopathy [Normal Anterior Cervical Nodes] : no anterior cervical lymphadenopathy [Normal Inguinal Nodes] : no inguinal lymphadenopathy [No Rash] : no rash [No Joint Swelling] : no joint swelling [Normal Affect] : the affect was normal [Normal Gait] : normal gait [de-identified] : firm breast tissue vs. mass left upper breast (2 areas, one at 12 oclock position, one at 1 oclock position), no nipple discharge on laying supine but pt was able to express white nipple discharge from right breast on sitting up [de-identified] : scattered tattoos, hyperpigmented mole right lower abdomen (notes unchanged)

## 2020-08-19 ENCOUNTER — TRANSCRIPTION ENCOUNTER (OUTPATIENT)
Age: 30
End: 2020-08-19

## 2020-09-08 ENCOUNTER — RESULT REVIEW (OUTPATIENT)
Age: 30
End: 2020-09-08

## 2020-09-08 ENCOUNTER — APPOINTMENT (OUTPATIENT)
Dept: OBGYN | Facility: CLINIC | Age: 30
End: 2020-09-08
Payer: COMMERCIAL

## 2020-09-08 PROCEDURE — 99395 PREV VISIT EST AGE 18-39: CPT

## 2020-09-10 ENCOUNTER — APPOINTMENT (OUTPATIENT)
Dept: BARIATRICS/WEIGHT MGMT | Facility: CLINIC | Age: 30
End: 2020-09-10
Payer: COMMERCIAL

## 2020-09-10 PROCEDURE — 99205 OFFICE O/P NEW HI 60 MIN: CPT | Mod: 95

## 2020-09-11 NOTE — HISTORY OF PRESENT ILLNESS
[Other Location: e.g. Home (Enter Location, City,State)___] : at [unfilled] [Home] : at home, [unfilled] , at the time of the visit. [Verbal consent obtained from patient] : the patient, [unfilled] [FreeTextEntry1] : 29 [place] PCOS, no other PMHx, obese class I\par on metformin, OCP\par has had trouble changing diet\par has gained weight with reduction in exercise\par working in office but in , deployed in past\par lives w/ wife, 3 kids, big extended family\par 9-5 job, fair amount of free time\par cooks all meals, no takeout\par diet is high in meat, fruit juices, some veggies, white rice\par no regular exercise now\par \par Due to a risk of developing comorbidities this patient requires medical treatment for overweight / obesity\par

## 2020-09-11 NOTE — ASSESSMENT
[FreeTextEntry1] : -diet and lifestyle discussed at length\par -high fiber, vegetarian, whole foods encouraged\par -inrease exercise\par -cut out processed foods\par -continue metformin, adding GLP-1\par \par Bariatric surgery history: none\par Obesity comorbidities: PCOS\par Anti-obesity medications: none\par Obesity medication side effects: n/a\par

## 2020-09-30 ENCOUNTER — APPOINTMENT (OUTPATIENT)
Dept: BARIATRICS/WEIGHT MGMT | Facility: CLINIC | Age: 30
End: 2020-09-30
Payer: COMMERCIAL

## 2020-09-30 PROCEDURE — 99213 OFFICE O/P EST LOW 20 MIN: CPT | Mod: 95

## 2020-10-01 NOTE — HISTORY OF PRESENT ILLNESS
[Home] : at home, [unfilled] , at the time of the visit. [Other Location: e.g. Home (Enter Location, City,State)___] : at [unfilled] [Verbal consent obtained from patient] : the patient, [unfilled] [FreeTextEntry1] : obesity class I, PCOS\par tolerating rybelsus\par has reduced red meat, processed foods\par increased whole foods\par has lost 9 lbs over past 2 weeks\par having an effect on appetite, tolerating fine, some nausea and HA that has improved\par exercising when able\par \par Due to comorbidities this patient requires medical treatment for overweight / obesity\par \par

## 2020-10-01 NOTE — ASSESSMENT
[FreeTextEntry1] : -continue rybelsus at current dose\par -continue diet and exercise as doing\par -follow up in 3 weeks\par \par Bariatric surgery history: none\par Obesity comorbidities: PCOS\par Anti-obesity medications: semaglutide\par Obesity medication side effects: nausea, headache\par

## 2020-10-01 NOTE — REASON FOR VISIT
[Follow-Up Visit] : a follow-up visit for [Obesity] : obesity [Polycystic Ovary Syndrome] : polycystic ovary syndrome

## 2020-10-12 ENCOUNTER — EMERGENCY (EMERGENCY)
Facility: HOSPITAL | Age: 30
LOS: 0 days | Discharge: HOME | End: 2020-10-12
Payer: COMMERCIAL

## 2020-10-12 ENCOUNTER — TRANSCRIPTION ENCOUNTER (OUTPATIENT)
Age: 30
End: 2020-10-12

## 2020-10-12 VITALS
HEART RATE: 76 BPM | SYSTOLIC BLOOD PRESSURE: 102 MMHG | TEMPERATURE: 98 F | WEIGHT: 164.91 LBS | DIASTOLIC BLOOD PRESSURE: 72 MMHG | HEIGHT: 64 IN | OXYGEN SATURATION: 99 % | RESPIRATION RATE: 16 BRPM
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
## 2020-10-12 DIAGNOSIS — R51.9 HEADACHE, UNSPECIFIED: ICD-10-CM

## 2020-10-12 DIAGNOSIS — S06.0X0A CONCUSSION WITHOUT LOSS OF CONSCIOUSNESS, INITIAL ENCOUNTER: ICD-10-CM
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
## 2020-10-12 DIAGNOSIS — R42 DIZZINESS AND GIDDINESS: ICD-10-CM

## 2020-10-12 PROCEDURE — 99284 EMERGENCY DEPT VISIT MOD MDM: CPT

## 2020-10-12 RX ORDER — MECLIZINE HCL 12.5 MG
1 TABLET ORAL
Qty: 21 | Refills: 0
Start: 2020-10-12 | End: 2020-10-18

## 2020-10-12 RX ORDER — KETOROLAC TROMETHAMINE 30 MG/ML
60 SYRINGE (ML) INJECTION ONCE
Refills: 0 | Status: DISCONTINUED | OUTPATIENT
Start: 2020-10-12 | End: 2020-10-12

## 2020-10-12 RX ORDER — IBUPROFEN 200 MG
1 TABLET ORAL
Qty: 28 | Refills: 0
Start: 2020-10-12 | End: 2020-10-18

## 2020-10-12 RX ORDER — ONDANSETRON 8 MG/1
1 TABLET, FILM COATED ORAL
Qty: 12 | Refills: 0
Start: 2020-10-12 | End: 2020-10-15

## 2020-10-12 RX ORDER — MECLIZINE HCL 12.5 MG
25 TABLET ORAL ONCE
Refills: 0 | Status: COMPLETED | OUTPATIENT
Start: 2020-10-12 | End: 2020-10-12

## 2020-10-12 RX ADMIN — Medication 60 MILLIGRAM(S): at 17:35

## 2020-10-12 RX ADMIN — Medication 25 MILLIGRAM(S): at 17:36

## 2020-10-12 NOTE — ED PROVIDER NOTE - NSFOLLOWUPCLINICS_GEN_ALL_ED_FT
[facility] Concussion [facility]  Concussion  [address] [place] [id number]  Phone: [phone]  Fax:   Follow Up Time:

## 2020-10-12 NOTE — ED ADULT TRIAGE NOTE - CHIEF COMPLAINT QUOTE
had virtual exam for headache s/p hitting head yesterday. was instructed to go to hospital to r/o concussion Pt states LOC Pt drove to hospital neuro WNL equal strength both sides

## 2020-10-12 NOTE — ED ADULT NURSE NOTE - NSIMPLEMENTINTERV_GEN_ALL_ED
Implemented All Universal Safety Interventions:  [place] to call system. Call bell, personal items and telephone within reach. Instruct patient to call for assistance. Room bathroom lighting operational. Non-slip footwear when patient is off stretcher. Physically safe environment: no spills, clutter or unnecessary equipment. Stretcher in lowest position, wheels locked, appropriate side rails in place.

## 2020-10-12 NOTE — ED PROVIDER NOTE - PHYSICAL EXAMINATION
Physical Exam    Vital Signs: I have reviewed the initial vital signs.  Constitutional: well-nourished, appears stated age, no acute distress  Eyes: PERRLA, and symmetrical lids.  ENT: Neck supple with no adenopathy, moist MM.  Cardiovascular: regular rate, regular rhythm, well-perfused extremities  Respiratory: unlabored respiratory effort, clear to auscultation bilaterally  Gastrointestinal: soft, non-tender abdomen, no pulsatile mass  Musculoskeletal: supple neck, no lower extremity edema  Integumentary: warm, dry, no rash  Neurologic: awake, alert, cranial nerves II-XII grossly intact, extremities’ motor and sensory functions grossly intact, no ataxia, no dysemtria  Psychiatric: A&Ox3, appropriate mood, appropriate affect

## 2020-10-12 NOTE — ED ADULT NURSE NOTE - OBJECTIVE STATEMENT
received pt sitting on the chair c/o headache  at the top of the head s/p hitting head  on the wood deck yesterday.  c/o feeling more tired than usual denies any medical hx denies any nausea vomiting denies any LOC, no laceration or briuse noted

## 2020-10-12 NOTE — ED PROVIDER NOTE - CLINICAL SUMMARY MEDICAL DECISION MAKING FREE TEXT BOX
Pt pw closed head injury with concussion neurologically intact. Plan follow up with PMD and advised on concussion precautions.

## 2020-10-12 NOTE — ED PROVIDER NOTE - PATIENT PORTAL LINK FT
You can access the FollowMyHealth Patient Portal offered by [facility] by registering at the following website: [email or web address]. By joining [employer]’s FollowMyHealth portal, you will also be able to view your health information using other applications (apps) compatible with our system.

## 2020-10-12 NOTE — ED PROVIDER NOTE - OBJECTIVE STATEMENT
28 yo f pw closed head injury s/p bumping head against a wooden beam with no loc, no n/v; yesterday morning. Since pt felt a gradual onset not maximal at onset headache with no focal deficits or ams. reports that she feels dizzy made worse by head movement.    I have reviewed available current nursing and previous documentation of past medical, surgical, family, and/or social history.

## 2020-10-16 ENCOUNTER — APPOINTMENT (OUTPATIENT)
Dept: INTERNAL MEDICINE | Facility: CLINIC | Age: 30
End: 2020-10-16
Payer: COMMERCIAL

## 2020-10-16 VITALS
BODY MASS INDEX: 28.67 KG/M2 | DIASTOLIC BLOOD PRESSURE: 60 MMHG | WEIGHT: 167 LBS | SYSTOLIC BLOOD PRESSURE: 102 MMHG | OXYGEN SATURATION: 98 % | HEART RATE: 66 BPM

## 2020-10-16 PROCEDURE — 99214 OFFICE O/P EST MOD 30 MIN: CPT

## 2020-10-16 NOTE — ASSESSMENT
[FreeTextEntry1] : 29-year-old female who presents 5 days after sustaining concussion.  The acute concussion evaluation form was completed with minimal symptoms noted.  Her symptoms are extremely mild at this time and are resolving.  No further follow-up is needed and she may resume full work duties as tolerated.  Note given and copy in the chart.\par \par I offered her flu shot and she states she will be getting that at work next week.\par \par Follow-up as needed

## 2020-10-16 NOTE — HISTORY OF PRESENT ILLNESS
[FreeTextEntry8] : 29-year-old female with a history of gluten intolerance, anxiety and depression who comes in today after sustaining a mild concussion at home.  5 days ago was bending down to do something with her son on her wooden porch and that she got up quickly hit herself on the edge of the rale on the top of her head.  She states that she staggered backwards but did not fall and was a little dazed but did not think much about it.  On Monday she started to notice that she was running red lights and was directed to go home.  Because of this mild confusion she was directed to the emergency room where she was diagnosed with a mild concussion.  She was advised to sequester herself in a dark room and sleep to allow her brain to heal.  She states her headache has improved and she is feeling much better.  Needs a clearance note to return to work.  Denies any nausea, vomiting or diarrhea.  The acute concussion evaluation form was completed.

## 2020-10-16 NOTE — REVIEW OF SYSTEMS
[Headache] : headache [Dizziness] : no dizziness [Confusion] : no confusion [Negative] : Gastrointestinal

## 2020-10-21 ENCOUNTER — APPOINTMENT (OUTPATIENT)
Dept: BARIATRICS/WEIGHT MGMT | Facility: CLINIC | Age: 30
End: 2020-10-21
Payer: COMMERCIAL

## 2020-10-21 PROCEDURE — 99213 OFFICE O/P EST LOW 20 MIN: CPT | Mod: 95

## 2020-10-21 NOTE — ASSESSMENT
[FreeTextEntry1] : continue diet\par continue rybelus\par continue exercise\par doing very well\par \par Bariatric surgery history: none\par Obesity comorbidities: PCOS\par Anti-obesity medications: rybelsus\par Obesity medication side effects: none\par

## 2020-10-21 NOTE — HISTORY OF PRESENT ILLNESS
[FreeTextEntry1] : pmhx PCOS obesity uncomplicated now overwt\par very active exerciser, wt gain during covid, snack foods junk, carbs\par since first visit has lost ~26 lbs, in 2.5 months\par tolerating rybelus 3 mg\par exercising aviding and eating lean protein, veggies fiber\par \par Due to a risk of developing comorbidities this patient requires medical treatment for overweight / obesity\par

## 2020-11-09 RX ORDER — ORAL SEMAGLUTIDE 3 MG/1
3 TABLET ORAL
Qty: 30 | Refills: 1 | Status: DISCONTINUED | COMMUNITY
Start: 2020-09-10 | End: 2020-11-09

## 2020-11-18 ENCOUNTER — APPOINTMENT (OUTPATIENT)
Dept: BARIATRICS/WEIGHT MGMT | Facility: CLINIC | Age: 30
End: 2020-11-18
Payer: COMMERCIAL

## 2020-11-18 PROCEDURE — 99214 OFFICE O/P EST MOD 30 MIN: CPT | Mod: 95

## 2020-11-18 RX ORDER — PEN NEEDLE, DIABETIC 32 GX 1/4"
32G X 6 MM NEEDLE, DISPOSABLE MISCELLANEOUS
Qty: 30 | Refills: 3 | Status: DISCONTINUED | COMMUNITY
Start: 2020-11-09 | End: 2020-11-18

## 2020-11-18 RX ORDER — LIRAGLUTIDE 6 MG/ML
18 INJECTION, SOLUTION SUBCUTANEOUS
Qty: 1 | Refills: 3 | Status: DISCONTINUED | COMMUNITY
Start: 2020-11-09 | End: 2020-11-18

## 2020-11-19 NOTE — ASSESSMENT
[FreeTextEntry1] : -continue lifestyle changes\par -med options limited, starting topiramate; continue phentermine\par -extensive counseling provided re: topiramate\par -follow up in 1 month\par \par Bariatric surgery history: none\par Obesity comorbidities: PCOS\par Anti-obesity medications: phentermine\par Obesity medication side effects: dry mouth\par

## 2020-11-19 NOTE — HISTORY OF PRESENT ILLNESS
[Home] : at home, [unfilled] , at the time of the visit. [Other Location: e.g. Home (Enter Location, City,State)___] : at [unfilled] [Verbal consent obtained from patient] : the patient, [unfilled] [FreeTextEntry1] : pmhx obesity class I, PCOS\par doing very well, has lost > 20 lbs\par had to stop rybelsus d/t insurance\par now on phentermine\par tolerating, provides appetite control but tolerable dry mouth\par wt stable after 2 lb regain\par has continued diet changes

## 2020-12-16 ENCOUNTER — APPOINTMENT (OUTPATIENT)
Dept: BARIATRICS/WEIGHT MGMT | Facility: CLINIC | Age: 30
End: 2020-12-16
Payer: COMMERCIAL

## 2020-12-16 PROCEDURE — 99214 OFFICE O/P EST MOD 30 MIN: CPT | Mod: 95

## 2020-12-21 ENCOUNTER — APPOINTMENT (OUTPATIENT)
Dept: ENDOCRINOLOGY | Facility: CLINIC | Age: 30
End: 2020-12-21
Payer: OTHER GOVERNMENT

## 2020-12-21 VITALS
DIASTOLIC BLOOD PRESSURE: 70 MMHG | OXYGEN SATURATION: 98 % | HEIGHT: 64 IN | HEART RATE: 93 BPM | TEMPERATURE: 97.3 F | BODY MASS INDEX: 26.29 KG/M2 | WEIGHT: 154 LBS | SYSTOLIC BLOOD PRESSURE: 120 MMHG

## 2020-12-21 PROCEDURE — 99072 ADDL SUPL MATRL&STAF TM PHE: CPT

## 2020-12-21 PROCEDURE — 99214 OFFICE O/P EST MOD 30 MIN: CPT

## 2020-12-21 NOTE — HISTORY OF PRESENT ILLNESS
[Home] : at home, [unfilled] , at the time of the visit. [Other Location: e.g. Home (Enter Location, City,State)___] : at [unfilled] [Verbal consent obtained from patient] : the patient, [unfilled] [FreeTextEntry1] : hx obesity class I PCOS\par did very well lost > 20 lbs w/ exercise, dietary changes, and rybelsus\par lost coverage for rybelsus so had to start phen / tpm\par weight stable now on phen / tpm, taking 37.5 / 25 mg\par she is talking to insurance about getting rybelsus covered given PCOS\par does not want to increase metformin from current dose \par \par Due to comorbidities this patient requires medical treatment for overweight / obesity\par \par

## 2020-12-21 NOTE — ASSESSMENT
[FreeTextEntry1] : -continue lifestyle changes\par -increase exercise to push this along\par -continue phen / tpm, increase gradually to max dose, instructions given\par -continue to try to get rybelsus covered\par \par Bariatric surgery history: none\par Obesity comorbidities: PCOS\par Anti-obesity medications: phentermine topiramate\par Obesity medication side effects: dry mouth\par

## 2020-12-28 ENCOUNTER — TRANSCRIPTION ENCOUNTER (OUTPATIENT)
Age: 30
End: 2020-12-28

## 2020-12-28 RX ORDER — PHENTERMINE HYDROCHLORIDE 37.5 MG/1
37.5 TABLET ORAL
Qty: 30 | Refills: 2 | Status: DISCONTINUED | COMMUNITY
Start: 2020-11-03 | End: 2020-12-28

## 2020-12-28 RX ORDER — TOPIRAMATE 25 MG/1
25 TABLET, FILM COATED ORAL
Qty: 120 | Refills: 2 | Status: DISCONTINUED | COMMUNITY
Start: 2020-11-18 | End: 2020-12-28

## 2021-01-13 ENCOUNTER — APPOINTMENT (OUTPATIENT)
Dept: BARIATRICS/WEIGHT MGMT | Facility: CLINIC | Age: 31
End: 2021-01-13
Payer: COMMERCIAL

## 2021-01-13 PROCEDURE — 99213 OFFICE O/P EST LOW 20 MIN: CPT | Mod: 95

## 2021-01-14 NOTE — ASSESSMENT
[FreeTextEntry1] : -continue moving more plant based\par -start rybelsus 7 mg\par -continue exercise\par \par Bariatric surgery history: none\par Obesity comorbidities: pcos\par Anti-obesity medications: rybelsus\par Obesity medication side effects: none\par

## 2021-01-14 NOTE — HISTORY OF PRESENT ILLNESS
[Home] : at home, [unfilled] , at the time of the visit. [Other Location: e.g. Home (Enter Location, City,State)___] : at [unfilled] [Verbal consent obtained from patient] : the patient, [unfilled] [FreeTextEntry1] : ms farias has lost >20 lbs\par she was able to obtain rybelsus through insurance\par which has helped with insulin resistance / PCOS\par she continues avid exercise\par she is moving more plant based however right now still eating a lot of relatively healthy animal protein\par \par Due to comorbidities this patient requires medical treatment for overweight / obesity\par

## 2021-02-24 ENCOUNTER — APPOINTMENT (OUTPATIENT)
Dept: BARIATRICS/WEIGHT MGMT | Facility: CLINIC | Age: 31
End: 2021-02-24
Payer: COMMERCIAL

## 2021-02-24 PROCEDURE — 99213 OFFICE O/P EST LOW 20 MIN: CPT | Mod: 95

## 2021-02-25 NOTE — ASSESSMENT
[FreeTextEntry1] : -extending follow up period\par -continue rybelsus 7 mg\par -we need to discuss food again in the future - started eating beef again\par -continue exercise\par \par Bariatric surgery history: none\par Obesity comorbidities: PCOS\par Anti-obesity medications: rybelsus\par Obesity medication side effects: none\par

## 2021-02-25 NOTE — HISTORY OF PRESENT ILLNESS
[Home] : at home, [unfilled] , at the time of the visit. [Other Location: e.g. Home (Enter Location, City,State)___] : at [unfilled] [Verbal consent obtained from patient] : the patient, [unfilled] [FreeTextEntry1] : Ms. Pulliam has lost 28 lbs\par she is happy at current weight\par diet is low in processed foods\par however has reintroduced some high fat protein\par exercises avidly\par rybelsus 7 mg working well\par apptite is well controlled\par \par Due to a risk of developing comorbidities this patient requires medical treatment for overweight / obesity\par

## 2021-03-08 ENCOUNTER — RX RENEWAL (OUTPATIENT)
Age: 31
End: 2021-03-08

## 2021-04-23 ENCOUNTER — APPOINTMENT (OUTPATIENT)
Dept: ENDOCRINOLOGY | Facility: CLINIC | Age: 31
End: 2021-04-23
Payer: OTHER GOVERNMENT

## 2021-04-23 VITALS
HEART RATE: 88 BPM | SYSTOLIC BLOOD PRESSURE: 117 MMHG | BODY MASS INDEX: 24.89 KG/M2 | WEIGHT: 145 LBS | DIASTOLIC BLOOD PRESSURE: 73 MMHG | TEMPERATURE: 97.5 F | OXYGEN SATURATION: 97 %

## 2021-04-23 DIAGNOSIS — L68.0 HIRSUTISM: ICD-10-CM

## 2021-04-23 PROCEDURE — 99204 OFFICE O/P NEW MOD 45 MIN: CPT

## 2021-04-23 PROCEDURE — 99072 ADDL SUPL MATRL&STAF TM PHE: CPT

## 2021-04-30 LAB
CHOLEST SERPL-MCNC: 207 MG/DL
HDLC SERPL-MCNC: 61 MG/DL
LDLC SERPL CALC-MCNC: 127 MG/DL
NONHDLC SERPL-MCNC: 146 MG/DL
TRIGL SERPL-MCNC: 94 MG/DL

## 2021-05-03 LAB — DHEA-SULFATE, SERUM: 69 UG/DL

## 2021-05-04 ENCOUNTER — APPOINTMENT (OUTPATIENT)
Dept: ENDOCRINOLOGY | Facility: CLINIC | Age: 31
End: 2021-05-04

## 2021-05-05 ENCOUNTER — APPOINTMENT (OUTPATIENT)
Dept: BARIATRICS/WEIGHT MGMT | Facility: CLINIC | Age: 31
End: 2021-05-05
Payer: COMMERCIAL

## 2021-05-05 ENCOUNTER — TRANSCRIPTION ENCOUNTER (OUTPATIENT)
Age: 31
End: 2021-05-05

## 2021-05-05 PROCEDURE — 99213 OFFICE O/P EST LOW 20 MIN: CPT | Mod: 95

## 2021-05-05 RX ORDER — METFORMIN HYDROCHLORIDE 500 MG/1
500 TABLET, COATED ORAL
Qty: 180 | Refills: 1 | Status: DISCONTINUED | COMMUNITY
Start: 2020-06-24 | End: 2021-05-05

## 2021-05-10 NOTE — ASSESSMENT
[FreeTextEntry1] : -continue shifting towards more plant-based\par -continue rybelsus 3 mg; plan to transition to metformin only at some point\par -continue exercise\par -we will incorporate fasting, discuss more at follow up\par \par Bariatric surgery history: none\par Obesity comorbidities: PCOS\par Anti-obesity medications: rybelsus\par Obesity medication side effects: none\par

## 2021-05-10 NOTE — HISTORY OF PRESENT ILLNESS
[Home] : at home, [unfilled] , at the time of the visit. [Other Location: e.g. Home (Enter Location, City,State)___] : at [unfilled] [Verbal consent obtained from patient] : the patient, [unfilled] [FreeTextEntry1] : Ms Pulliam has maintained weight loss on lower rybelsus dose\par she would like to continue\par recent issue of potential side effects of weight loss discussed with endocrinologist\par who recommended dose reduction rybelsus\par ms pulliam says this issue preceding rybelsus, and improved with it\par but she feels she would like to be on a lower dose\par plans to continue shifting towards more plant-based as she titrates down\par \par Due to comorbidities this patient requires medical treatment for overweight / obesity\par

## 2021-06-16 ENCOUNTER — RX RENEWAL (OUTPATIENT)
Age: 31
End: 2021-06-16

## 2021-06-17 ENCOUNTER — RX RENEWAL (OUTPATIENT)
Age: 31
End: 2021-06-17

## 2021-07-07 ENCOUNTER — APPOINTMENT (OUTPATIENT)
Dept: INTERNAL MEDICINE | Facility: CLINIC | Age: 31
End: 2021-07-07
Payer: OTHER GOVERNMENT

## 2021-07-07 ENCOUNTER — LABORATORY RESULT (OUTPATIENT)
Age: 31
End: 2021-07-07

## 2021-07-07 DIAGNOSIS — G62.9 POLYNEUROPATHY, UNSPECIFIED: ICD-10-CM

## 2021-07-07 PROCEDURE — 99213 OFFICE O/P EST LOW 20 MIN: CPT

## 2021-07-08 LAB
B BURGDOR IGG+IGM SER QL IB: NORMAL
TSH SERPL-ACNC: 0.77 UIU/ML
TTG IGA SER IA-ACNC: <1.2 U/ML
TTG IGA SER-ACNC: NEGATIVE
VIT B12 SERPL-MCNC: 336 PG/ML

## 2021-07-08 NOTE — PHYSICAL EXAM
[Grossly Normal Strength/Tone] : grossly normal strength/tone [Normal] : the deep tendon reflexes were normal

## 2021-07-08 NOTE — HISTORY OF PRESENT ILLNESS
[FreeTextEntry8] : Starting in February, has had several recurrences of allodynia.  Has occurred left leg, right inner thigh, and most recently left lumbar/buttock region.  The previous episodes lasted a day or so, the most recently one, started about two weeks ago, and has persisted.  very uncomfortable to lie down.  Denies headache, vertigo, diplopia, blurry vision, anosmia, dysgeusia, facial asymmetry, weakness, ataxia, uncoordination.

## 2021-07-08 NOTE — ASSESSMENT
[FreeTextEntry1] : Unclear etiology.  Multiple areas involved raises concern about MS\par Check metabolic causes.  Low threshold for neuro referral. \par \par 24 minutes spent in preparation of this visit, including review of previous notes and test results, interview and examination of patient, discussion of plan, arranging for appropriate testing and treatment, and documentation.\par

## 2021-07-14 LAB
ANA SER IF-ACNC: NEGATIVE
COPPER SERPL-MCNC: 95 UG/DL
VIT B1 SERPL-MCNC: 120.3 NMOL/L

## 2021-07-27 NOTE — HISTORY OF PRESENT ILLNESS
[FreeTextEntry1] : 29 y/o woman with no PMHx presenting for f/u of PCOS.\par \par In Oct 2019: patient was menstruating for 6 weeks. She went to OBGYN and had Transvaginal U/S. Was told she had one cyst on right ovary and was ovulating at the time. Took birth control for 1 week, with some spotting through December. After that, she stopped having periods, or any spotting. Last date of spotting late Dec. \par \par  Other Positive symptoms: \par (+) 1 yr of: galactorrhea b/l, worse on right. Had breast U/S with OBGYN, all normal; inc facial hair on side of face and stomach, always had facial hair on chin and neck; 30lb unintentional weight gain\par \par (+) 6 months of loss of sex drive (Patient's biggest concern); severe constipation; nocturia 2x/night; polydypsia; night sweats; occasional hot flashes and heat intolerance; vaginal dryness; mood swings, short tempered; easier bruising; fatigue; occasional headaches \par \par Patient has never had any symptoms like this before. \par \par Denies changes in vision, hair loss, palpitations, chest pain, abdominal pain, diarrhea, hair loss, bleeding\par \par Menarche age 9, always regular periods. \par Sexually active with 1 female partner, wife. No other partners. \par \par No hx of infertility issues: Has 2 children (age 10 and 8), vaginal delivery \par \par Diet higher on carbs b/c of increased rice. 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 as she was trying to get pregnant. She then saw weight loss specialist who Rx Rybelsus and she lost 25 lbs. Reports while she was on it her periods normalized and all her sx resolved. Her insurance then changed and it was no longer covered. She was the started on Phentermine but states it makes her feel like she's on edge. Topamax then added but also feels "off" on it. \par \par Patient stated that she has been having extreme low sex drive that is affecting her marriage for the last 1 year.\par \par She was started on Junel in June, took for 3 month and stopped, and she was not having period so she restarted it.  She has regular menses on Junel.  She does not think that the low sex drive is due to being on semaglutide 9.  She does not think is related to taking oral contraceptive as she continued to have symptoms despite being off of the medication.  She reports that her marriage is good and solid.  She denies any marital issues with her wife.  She has 2 biological children and her wife has 1 biological child.\par \par \par

## 2021-07-27 NOTE — ASSESSMENT
[FreeTextEntry1] : Patient is a 30-year-old woman with history of polycystic ovarian syndrome, here for endocrinology follow-up.\par Patient used to follow-up with my colleague Dr. Ana Cristina Cantu but now transferring to my care.\par \par 1.  Secondary amenorrhea/PCOS\par Patient meets 2 out of 3 [place] criteria for PCOS: Irregular menses and clinical symptoms of hyperandrogenism, hirsutism\par Patient had hormonal work-up which showed a normal TSH level 1.7; estradiol level of 31; testosterone 1105.7: Prolactin level 17.9: FSH is 9.4, LH 57.8; and normal 17 hydroxyprogesterone level of 51: DHEA-S level is 56\par Patient wanted to avoid birth control as she is trying to get pregnant.  She was started on Metformin.\par Patient is also seeing weight loss doctor and she is using semaglutide orally.\par Continue follow-up with OB/GYN\par For decreased libido, we discussed stopping OCP and she is also working with obesity medicine doctor to come off of Reybslus. \par Will check DHEAS level and testosterone level today\par Can try OTC DHEA supplements, 50mg once to see if it improves libido. \par If no improvement, I would recommend stopping OCP and use Provera every 3 months to induce a period to prevent endometrial thickening.  \par \par 2.  History of galactorrhea\par Repeat prolactin level has been normal.  Her menses has been:\par \par 3.  Obesity\par Patient is following up with [facility] obesity medicine.\par He is currently on Rybelsus 7 mg once daily\par Continue with diet and exercise

## 2021-07-28 ENCOUNTER — TRANSCRIPTION ENCOUNTER (OUTPATIENT)
Age: 31
End: 2021-07-28

## 2021-08-04 ENCOUNTER — FORM ENCOUNTER (OUTPATIENT)
Age: 31
End: 2021-08-04

## 2021-09-06 ENCOUNTER — FORM ENCOUNTER (OUTPATIENT)
Age: 31
End: 2021-09-06

## 2021-09-07 ENCOUNTER — APPOINTMENT (OUTPATIENT)
Dept: OBGYN | Facility: CLINIC | Age: 31
End: 2021-09-07
Payer: OTHER GOVERNMENT

## 2021-09-07 ENCOUNTER — RESULT REVIEW (OUTPATIENT)
Age: 31
End: 2021-09-07

## 2021-09-07 ENCOUNTER — FORM ENCOUNTER (OUTPATIENT)
Age: 31
End: 2021-09-07

## 2021-09-07 PROCEDURE — 99395 PREV VISIT EST AGE 18-39: CPT

## 2021-09-11 ENCOUNTER — FORM ENCOUNTER (OUTPATIENT)
Age: 31
End: 2021-09-11

## 2021-09-27 ENCOUNTER — FORM ENCOUNTER (OUTPATIENT)
Age: 31
End: 2021-09-27

## 2021-10-01 ENCOUNTER — APPOINTMENT (OUTPATIENT)
Dept: UROGYNECOLOGY | Facility: CLINIC | Age: 31
End: 2021-10-01

## 2021-10-25 ENCOUNTER — APPOINTMENT (OUTPATIENT)
Dept: ENDOCRINOLOGY | Facility: CLINIC | Age: 31
End: 2021-10-25
Payer: OTHER GOVERNMENT

## 2021-10-25 VITALS
DIASTOLIC BLOOD PRESSURE: 60 MMHG | OXYGEN SATURATION: 99 % | TEMPERATURE: 98 F | SYSTOLIC BLOOD PRESSURE: 100 MMHG | HEART RATE: 58 BPM | BODY MASS INDEX: 22.93 KG/M2 | WEIGHT: 133.6 LBS

## 2021-10-25 DIAGNOSIS — F41.9 ANXIETY DISORDER, UNSPECIFIED: ICD-10-CM

## 2021-10-25 DIAGNOSIS — F32.A ANXIETY DISORDER, UNSPECIFIED: ICD-10-CM

## 2021-10-25 PROCEDURE — 99214 OFFICE O/P EST MOD 30 MIN: CPT

## 2021-10-25 RX ORDER — MAGNESIUM 200 MG
200 TABLET ORAL DAILY
Refills: 0 | Status: DISCONTINUED | COMMUNITY
Start: 2020-06-16 | End: 2021-10-25

## 2021-10-25 RX ORDER — NORETHINDRONE ACETATE AND ETHINYL ESTRADIOL 1.5; 3 MG/1; UG/1
1.5-3 TABLET ORAL
Qty: 3 | Refills: 1 | Status: DISCONTINUED | COMMUNITY
Start: 2020-12-11 | End: 2021-10-25

## 2021-10-25 RX ORDER — METFORMIN ER 500 MG 500 MG/1
500 TABLET ORAL
Qty: 180 | Refills: 1 | Status: DISCONTINUED | COMMUNITY
Start: 2021-05-05 | End: 2021-10-25

## 2021-10-25 RX ORDER — NORETHINDRONE ACETATE AND ETHINYL ESTRADIOL .03; 1.5 MG/1; MG/1
1.5-3 TABLET ORAL
Qty: 3 | Refills: 1 | Status: DISCONTINUED | COMMUNITY
Start: 2020-08-14 | End: 2021-10-25

## 2021-10-25 RX ORDER — OMEGA-3/DHA/EPA/FISH OIL 300-1000MG
1000 CAPSULE ORAL
Refills: 0 | Status: DISCONTINUED | COMMUNITY
Start: 2020-06-16 | End: 2021-10-25

## 2021-10-25 RX ORDER — PNV NO.95/FERROUS FUM/FOLIC AC 28MG-0.8MG
100 TABLET ORAL DAILY
Refills: 0 | Status: DISCONTINUED | COMMUNITY
Start: 2020-06-16 | End: 2021-10-25

## 2021-10-25 RX ORDER — PRENATAL VIT NO.130/IRON/FOLIC 27MG-0.8MG
27-0.8 TABLET ORAL DAILY
Refills: 0 | Status: DISCONTINUED | COMMUNITY
Start: 2020-06-16 | End: 2021-10-25

## 2021-10-25 RX ORDER — LIDOCAINE 5% 700 MG/1
5 PATCH TOPICAL
Qty: 1 | Refills: 0 | Status: DISCONTINUED | COMMUNITY
Start: 2021-07-07 | End: 2021-10-25

## 2021-10-25 NOTE — HISTORY OF PRESENT ILLNESS
[FreeTextEntry1] : 29 y/o woman with no PMHx presenting for f/u of PCOS.\par \par In Oct 2019: patient was menstruating for 6 weeks. She went to OBGYN and had Transvaginal U/S. Was told she had one cyst on right ovary and was ovulating at the time. Took birth control for 1 week, with some spotting through December. After that, she stopped having periods, or any spotting. Last date of spotting late Dec. \par \par  Other Positive symptoms: \par (+) 1 yr of: galactorrhea b/l, worse on right. Had breast U/S with OBGYN, all normal; inc facial hair on side of face and stomach, always had facial hair on chin and neck; 30lb unintentional weight gain\par \par (+) 6 months of loss of sex drive (Patient's biggest concern); severe constipation; nocturia 2x/night; polydypsia; night sweats; occasional hot flashes and heat intolerance; vaginal dryness; mood swings, short tempered; easier bruising; fatigue; occasional headaches \par \par Patient has never had any symptoms like this before. \par \par Denies changes in vision, hair loss, palpitations, chest pain, abdominal pain, diarrhea, hair loss, bleeding\par \par Menarche age 9, always regular periods.  Her period became irregular in Nov 2019.  She was on deployment from , and she returned in Feb 2019.  \par Sexually active with 1 female partner, wife. No other partners. \par \par No hx of infertility issues: Has 2 children (age 10 and 8), vaginal delivery \par \par Diet higher on carbs b/c of increased rice. 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 as she was trying to get pregnant. She then saw weight loss specialist who Rx Rybelsus and she lost 25 lbs. Reports while she was on it her periods normalized and all her sx resolved. Her insurance then changed and it was no longer covered. She was the started on Phentermine but states it makes her feel like she's on edge. Topamax then added but also feels "off" on it. \par \par Patient stated that she has been having extreme low sex drive that is affecting her marriage for the last 1 year.\par \par She was started on Junel in June, took for 3 month and stopped, and she was not having period so she restarted it.  She has regular menses on Junel.  She does not think that the low sex drive is due to being on semaglutide 9.  She does not think is related to taking oral contraceptive as she continued to have symptoms despite being off of the medication.  She reports that her marriage is good and solid.  She denies any marital issues with her wife.  She has 2 biological children and her wife has 1 biological child.'\par \par She thinks that her symptoms started after she returned from deployement at [facility].  She would like a referral to psych. \par \par \par

## 2021-10-25 NOTE — ASSESSMENT
[FreeTextEntry1] : Patient is a 30-year-old woman with history of polycystic ovarian syndrome, here for endocrinology follow-up.\par Patient used to follow-up with my colleague Dr. Ana Cristina Cantu but now transferring to my care.\par \par 1.  Secondary amenorrhea/PCOS\par Patient meets 2 out of 3 [place] criteria for PCOS: Irregular menses and clinical symptoms of hyperandrogenism, hirsutism\par Patient had hormonal work-up which showed a normal TSH level 1.7; estradiol level of 31; testosterone 1105.7: Prolactin level 17.9: FSH is 9.4, LH 57.8; and normal 17 hydroxyprogesterone level of 51: DHEA-S level is 56\par Patient wanted to avoid birth control as she is trying to get pregnant.  She was started on Metformin.\par Patient is also seeing weight loss doctor and she is using semaglutide orally.\par Continue follow-up with OB/GYN\par Currently on Isibloom 0.15-30 mg-mcg, now with regular menses. \par \par 2.  History of galactorrhea\par Repeat prolactin level has been normal.  Her menses has been regular on OCP. \par She is no longer getting discharge from the breast.  \par Check Prolactin level today. \par \par 3.  Obesity\par Patient is following up with [facility] obesity medicine.\par He is currently on Rybelsus 3mg once weekly.\par Continue with diet and exercise\par Will continue to follow up with weight management. \par \par 4. Decreased sexual drive\par May be related to trauma secondary to prior  deployment.  Patient would like a referral to see psychiatrist.\par She is working with a therapist who recommended the above as well.\par Continues DHEA-S over-the-counter to see if it helps with improvement of sex drive.\par \par

## 2021-10-26 ENCOUNTER — RX RENEWAL (OUTPATIENT)
Age: 31
End: 2021-10-26

## 2021-10-29 LAB
25(OH)D3 SERPL-MCNC: 22.3 NG/ML
PROLACTIN SERPL-MCNC: 9.7 NG/ML

## 2021-11-15 ENCOUNTER — APPOINTMENT (OUTPATIENT)
Dept: PSYCHIATRY | Facility: CLINIC | Age: 31
End: 2021-11-15
Payer: OTHER GOVERNMENT

## 2021-11-15 PROCEDURE — 99205 OFFICE O/P NEW HI 60 MIN: CPT

## 2021-11-15 RX ORDER — ORAL SEMAGLUTIDE 7 MG/1
7 TABLET ORAL
Qty: 90 | Refills: 0 | Status: DISCONTINUED | COMMUNITY
Start: 2021-03-23

## 2021-11-15 RX ORDER — CLINDAMYCIN PHOSPHATE 20 MG/G
2 CREAM VAGINAL
Qty: 40 | Refills: 0 | Status: DISCONTINUED | COMMUNITY
Start: 2021-09-12

## 2021-12-01 DIAGNOSIS — Z78.9 OTHER SPECIFIED HEALTH STATUS: ICD-10-CM

## 2021-12-01 DIAGNOSIS — Z83.3 FAMILY HISTORY OF DIABETES MELLITUS: ICD-10-CM

## 2021-12-06 ENCOUNTER — APPOINTMENT (OUTPATIENT)
Dept: PSYCHIATRY | Facility: CLINIC | Age: 31
End: 2021-12-06
Payer: OTHER GOVERNMENT

## 2021-12-06 PROCEDURE — 99214 OFFICE O/P EST MOD 30 MIN: CPT

## 2021-12-07 ENCOUNTER — APPOINTMENT (OUTPATIENT)
Dept: OBGYN | Facility: CLINIC | Age: 31
End: 2021-12-07
Payer: OTHER GOVERNMENT

## 2021-12-07 VITALS
BODY MASS INDEX: 23.9 KG/M2 | SYSTOLIC BLOOD PRESSURE: 98 MMHG | WEIGHT: 140 LBS | HEIGHT: 64 IN | DIASTOLIC BLOOD PRESSURE: 62 MMHG

## 2021-12-07 PROCEDURE — 99213 OFFICE O/P EST LOW 20 MIN: CPT

## 2021-12-07 NOTE — END OF VISIT
[FreeTextEntry3] : I, Bernadette Rios, acted solely as a scribe for Dr. Marion Allen MD., on 12/07/2021.\par \par All medical record entries made by the scribe were at my, Dr. Marion Allen MD., direction and personally dictated by me on 12/07/2021. I have personally reviewed the chart and agree that the record accurately reflects my personal performance of the history, physical exam, assessment and plan.\par

## 2021-12-07 NOTE — HISTORY OF PRESENT ILLNESS
[FreeTextEntry1] : JOE ROSEN is a 31 year old  presenting for follow up. Pt complaints of burning discharge and external itchiness for about a month.\par

## 2021-12-07 NOTE — PLAN
[FreeTextEntry1] : JOE ROSEN is a 31 year old presenting for follow up\par -bd affirm\par -urine culture \par -spoke about taking probiotics \par -to use hydrocortisone cream

## 2021-12-09 LAB
CANDIDA VAG CYTO: NOT DETECTED
G VAGINALIS+PREV SP MTYP VAG QL MICRO: NOT DETECTED
T VAGINALIS VAG QL WET PREP: NOT DETECTED

## 2022-01-03 ENCOUNTER — APPOINTMENT (OUTPATIENT)
Dept: PSYCHIATRY | Facility: CLINIC | Age: 32
End: 2022-01-03
Payer: OTHER GOVERNMENT

## 2022-01-03 PROCEDURE — 99214 OFFICE O/P EST MOD 30 MIN: CPT | Mod: 95

## 2022-01-03 RX ORDER — LAMOTRIGINE 25 MG/1
25 TABLET ORAL
Qty: 45 | Refills: 0 | Status: DISCONTINUED | COMMUNITY
Start: 2021-11-15

## 2022-02-02 ENCOUNTER — APPOINTMENT (OUTPATIENT)
Dept: PSYCHIATRY | Facility: CLINIC | Age: 32
End: 2022-02-02
Payer: OTHER GOVERNMENT

## 2022-02-02 PROCEDURE — 99214 OFFICE O/P EST MOD 30 MIN: CPT | Mod: 95

## 2022-02-02 RX ORDER — TRAZODONE HYDROCHLORIDE 50 MG/1
50 TABLET ORAL
Qty: 30 | Refills: 0 | Status: DISCONTINUED | COMMUNITY
Start: 2022-01-03 | End: 2022-02-02

## 2022-04-26 ENCOUNTER — APPOINTMENT (OUTPATIENT)
Dept: PSYCHIATRY | Facility: CLINIC | Age: 32
End: 2022-04-26
Payer: OTHER GOVERNMENT

## 2022-04-26 PROCEDURE — 99214 OFFICE O/P EST MOD 30 MIN: CPT | Mod: 95

## 2022-05-06 ENCOUNTER — APPOINTMENT (OUTPATIENT)
Dept: BARIATRICS/WEIGHT MGMT | Facility: CLINIC | Age: 32
End: 2022-05-06
Payer: OTHER GOVERNMENT

## 2022-05-06 PROCEDURE — 99213 OFFICE O/P EST LOW 20 MIN: CPT | Mod: 95

## 2022-05-10 NOTE — HISTORY OF PRESENT ILLNESS
[Home] : at home, [unfilled] , at the time of the visit. [Other Location: e.g. Home (Enter Location, City,State)___] : at [unfilled] [Verbal consent obtained from patient] : the patient, [unfilled] [FreeTextEntry1] : Patient presents for weight loss and overweight/obese comorbidity management\par \par Sharifa has successfully lost ~50 lbs, is now normal weight\par she has had help from rybelsus 3 mg\par also exercising averaging 200+ mins / week, cardio and weight training\par eating low fat high protein plant leaning diet\par reports mental health is well, following with therapist and psychiatrist\par \par Due to comorbidities this patient requires medical treatment for overweight / obesity\par

## 2022-05-10 NOTE — ASSESSMENT
[FreeTextEntry1] : The following plan was agreed upon in discussion with this patient. This plan addresses this patient's overweight / obesity as well as the following medical comorbidities of overweight / obesity: polycystic ovarian syndrome ovarian syndrome\par -continue rybelsus 3 mg\par -continue excellent lifestyle changes\par -follow up in 4 months or prn\par \par Bariatric surgery history: none\par Overweight / obesity comorbidities: polycystic ovarian syndrome\par Anti-obesity medications: semaglutide\par Obesity medication side effects: none\par \par

## 2022-05-17 ENCOUNTER — APPOINTMENT (OUTPATIENT)
Dept: PSYCHIATRY | Facility: CLINIC | Age: 32
End: 2022-05-17
Payer: OTHER GOVERNMENT

## 2022-05-17 PROCEDURE — 99214 OFFICE O/P EST MOD 30 MIN: CPT | Mod: 95

## 2022-05-17 RX ORDER — QUETIAPINE FUMARATE 25 MG/1
25 TABLET ORAL
Qty: 30 | Refills: 0 | Status: DISCONTINUED | COMMUNITY
Start: 2021-11-15 | End: 2022-05-17

## 2022-07-27 ENCOUNTER — APPOINTMENT (OUTPATIENT)
Dept: PSYCHIATRY | Facility: CLINIC | Age: 32
End: 2022-07-27

## 2022-07-27 PROCEDURE — 99214 OFFICE O/P EST MOD 30 MIN: CPT | Mod: 95

## 2022-07-27 RX ORDER — LURASIDONE HYDROCHLORIDE 40 MG/1
40 TABLET, FILM COATED ORAL DAILY
Qty: 30 | Refills: 0 | Status: DISCONTINUED | COMMUNITY
Start: 2022-05-17 | End: 2022-07-27

## 2022-07-27 RX ORDER — LURASIDONE HYDROCHLORIDE 20 MG/1
20 TABLET, FILM COATED ORAL DAILY
Qty: 7 | Refills: 0 | Status: DISCONTINUED | COMMUNITY
Start: 2022-05-17 | End: 2022-07-27

## 2022-08-02 ENCOUNTER — APPOINTMENT (OUTPATIENT)
Dept: ENDOCRINOLOGY | Facility: CLINIC | Age: 32
End: 2022-08-02

## 2022-08-02 VITALS
DIASTOLIC BLOOD PRESSURE: 70 MMHG | TEMPERATURE: 97.9 F | SYSTOLIC BLOOD PRESSURE: 110 MMHG | OXYGEN SATURATION: 97 % | WEIGHT: 148 LBS | HEART RATE: 68 BPM | BODY MASS INDEX: 25.4 KG/M2

## 2022-08-02 DIAGNOSIS — N64.3 GALACTORRHEA NOT ASSOCIATED WITH CHILDBIRTH: ICD-10-CM

## 2022-08-02 DIAGNOSIS — E16.2 HYPOGLYCEMIA, UNSPECIFIED: ICD-10-CM

## 2022-08-02 PROCEDURE — 99214 OFFICE O/P EST MOD 30 MIN: CPT

## 2022-08-02 RX ORDER — BLOOD SUGAR DIAGNOSTIC
STRIP MISCELLANEOUS
Qty: 100 | Refills: 5 | Status: ACTIVE | COMMUNITY
Start: 2022-08-02 | End: 1900-01-01

## 2022-08-02 RX ORDER — LANCETS
EACH MISCELLANEOUS
Qty: 100 | Refills: 5 | Status: ACTIVE | COMMUNITY
Start: 2022-08-02 | End: 1900-01-01

## 2022-08-02 NOTE — ASSESSMENT
[FreeTextEntry1] : Patient is a 31 -year-old woman with history of polycystic ovarian syndrome, here for endocrinology follow-up.\par \par 1.  Secondary amenorrhea/PCOS\par Patient meets 2 out of 3 [place] criteria for PCOS: Irregular menses and clinical symptoms of hyperandrogenism, hirsutism\par Patient had hormonal work-up which showed a normal TSH level 1.7; estradiol level of 31; testosterone 1105.7: Prolactin level 17.9: FSH is 9.4, LH 57.8; and normal 17 hydroxyprogesterone level of 51: DHEA-S level is 56\par Patient wanted to avoid birth control as she is trying to get pregnant.  She was started on Metformin.\par Patient is also seeing weight loss doctor and she is using semaglutide orally.\par Continue follow-up with OB/GYN; going to see ob/gyn soon. \par Currently on Isibloom 0.15-30 mg-mcg, now with regular menses. \par \par 2.  History of galactorrhea\par Repeat prolactin level has been normal.  Her menses has been regular on OCP. \par She is no longer getting discharge from the breast.  \par Check Prolactin level today. \par \par 3.  Obesity\par Patient is following up with [facility] obesity medicine.\par He is currently on Rybelsus 7 mg once weekly.\par Continue with diet and exercise\par Will continue to follow up with weight management. \par \par 4. Decreased sexual drive\par May be related to trauma secondary to prior  deployment.  Patient would like a referral to see psychiatrist.\par She is working with a therapist who recommended the above as well.\par \par 5.  Hypoglycemic symptoms\par I sent in glucose testing stripS, taught patient how to use a glucometer.\par Asked patient to check her glucose if she is experiencing hypoglycemic symptoms\par \par \par rtc 6 M

## 2022-08-02 NOTE — HISTORY OF PRESENT ILLNESS
[FreeTextEntry1] : 32 y/o woman with no PMHx presenting for f/u of PCOS.\par \par  Other Positive symptoms: \par (+) 1 yr of: galactorrhea b/l, worse on right. Had breast U/S with OBGYN, all normal; inc facial hair on side of face and stomach, always had facial hair on chin and neck; 30lb unintentional weight gain\par \par (+) 6 months of loss of sex drive (Patient's biggest concern); severe constipation; nocturia 2x/night; polydypsia; night sweats; occasional hot flashes and heat intolerance; vaginal dryness; mood swings, short tempered; easier bruising; fatigue; occasional headaches \par \par Patient has never had any symptoms like this before. \par \par Denies changes in vision, hair loss, palpitations, chest pain, abdominal pain, diarrhea, hair loss, bleeding\par \par Menarche age 9, always regular periods.  Her period became irregular in Nov 2019.  She was on deployment from , and she returned in Feb 2019.  Sexually active with 1 female partner, wife. No other partners. \par \par No hx of infertility issues: Has 2 children (age 10 and 8), vaginal delivery \par \par Diet higher on carbs b/c of increased rice. 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 as she was trying to get pregnant. She then saw weight loss specialist who Rx Rybelsus and she lost 25 lbs. Reports while she was on it her periods normalized and all her sx resolved. Her insurance then changed and it was no longer covered. She was the started on Phentermine but states it makes her feel like she's on edge. Topamax then added but also feels "off" on it. \par \par Patient stated that she has been having extreme low sex drive that is affecting her marriage for the last 1 year.\par \par States that she wakes up in the middle of the night, with a pool of sweat.  She states that she has been having symptoms of hypoglycemia.  \par \par She's on Isibloom 0.15-30mg but she's not taking it every month.  \par

## 2022-08-03 LAB
25(OH)D3 SERPL-MCNC: 24.8 NG/ML
ESTIMATED AVERAGE GLUCOSE: 105 MG/DL
HBA1C MFR BLD HPLC: 5.3 %
PROLACTIN SERPL-MCNC: 7.2 NG/ML
TESTOST SERPL-MCNC: <2.5 NG/DL

## 2022-08-04 ENCOUNTER — APPOINTMENT (OUTPATIENT)
Dept: BARIATRICS/WEIGHT MGMT | Facility: CLINIC | Age: 32
End: 2022-08-04

## 2022-08-04 PROCEDURE — 99214 OFFICE O/P EST MOD 30 MIN: CPT | Mod: 95

## 2022-08-08 NOTE — HISTORY OF PRESENT ILLNESS
[Home] : at home, [unfilled] , at the time of the visit. [Other Location: e.g. Home (Enter Location, City,State)___] : at [unfilled] [Verbal consent obtained from patient] : the patient, [unfilled] [FreeTextEntry1] : Patient presents for weight loss and overweight/obese comorbidity management\par \par Sharifa has had some weight regain in setting of stressors and treatment for mental health\par she is on two weight gaining meds for treatment of bipolar 2 disorder\par she continues avid exercise\par however diet has lapsed, eating more processed foods and high fat animal protein\par \par Due to comorbidities this patient requires medical treatment for overweight / obesity\par

## 2022-08-08 NOTE — ASSESSMENT
[FreeTextEntry1] : The following plan was agreed upon in discussion with this patient. This plan addresses this patient's overweight / obesity as well as the following medical comorbidities of overweight / obesity: polycystic ovarian syndrome\par \par -increase to rybelsus 7 mg\par -new diet and educational resources provided\par -follow up 3 months or sooner prn\par \par Bariatric surgery history: none\par Overweight / obesity comorbidities: polycystic ovarian syndrome\par Current anti-obesity medications: rybelsus\par Obesity medication side effects: none\par

## 2022-08-24 ENCOUNTER — NON-APPOINTMENT (OUTPATIENT)
Age: 32
End: 2022-08-24

## 2022-08-25 ENCOUNTER — EMERGENCY (EMERGENCY)
Facility: HOSPITAL | Age: 32
LOS: 1 days | Discharge: HOME | End: 2022-08-25

## 2022-08-25 VITALS
TEMPERATURE: 98 F | OXYGEN SATURATION: 96 % | WEIGHT: 149.91 LBS | HEIGHT: 64 IN | HEART RATE: 79 BPM | RESPIRATION RATE: 18 BRPM | DIASTOLIC BLOOD PRESSURE: 65 MMHG | SYSTOLIC BLOOD PRESSURE: 95 MMHG

## 2022-08-25 DIAGNOSIS — R11.2 NAUSEA WITH VOMITING, UNSPECIFIED: ICD-10-CM

## 2022-08-25 DIAGNOSIS — Z53.21 PROCEDURE AND TREATMENT NOT CARRIED OUT DUE TO PATIENT LEAVING PRIOR TO BEING SEEN BY HEALTH CARE PROVIDER: ICD-10-CM

## 2022-08-25 DIAGNOSIS — R19.7 DIARRHEA, UNSPECIFIED: ICD-10-CM

## 2022-08-25 DIAGNOSIS — R10.9 UNSPECIFIED ABDOMINAL PAIN: ICD-10-CM

## 2022-08-25 PROCEDURE — L9991: CPT

## 2022-08-25 NOTE — ED ADULT TRIAGE NOTE - CHIEF COMPLAINT QUOTE
Patient c/o mid abdominal pain x 1 week with n/v/d. Last bm today. LMP 6/14. Denies urinary symptoms. Denies pmh.

## 2022-08-26 ENCOUNTER — APPOINTMENT (OUTPATIENT)
Dept: PSYCHIATRY | Facility: CLINIC | Age: 32
End: 2022-08-26

## 2022-09-06 ENCOUNTER — APPOINTMENT (OUTPATIENT)
Dept: INTERNAL MEDICINE | Facility: CLINIC | Age: 32
End: 2022-09-06

## 2022-09-06 ENCOUNTER — MED ADMIN CHARGE (OUTPATIENT)
Age: 32
End: 2022-09-06

## 2022-09-06 VITALS
HEART RATE: 72 BPM | HEIGHT: 64 IN | WEIGHT: 146 LBS | BODY MASS INDEX: 24.92 KG/M2 | SYSTOLIC BLOOD PRESSURE: 90 MMHG | RESPIRATION RATE: 14 BRPM | DIASTOLIC BLOOD PRESSURE: 60 MMHG

## 2022-09-06 DIAGNOSIS — Z23 ENCOUNTER FOR IMMUNIZATION: ICD-10-CM

## 2022-09-06 DIAGNOSIS — Z87.898 PERSONAL HISTORY OF OTHER SPECIFIED CONDITIONS: ICD-10-CM

## 2022-09-06 DIAGNOSIS — M79.669 PAIN IN UNSPECIFIED LOWER LEG: ICD-10-CM

## 2022-09-06 DIAGNOSIS — Z87.39 PERSONAL HISTORY OF OTHER DISEASES OF THE MUSCULOSKELETAL SYSTEM AND CONNECTIVE TISSUE: ICD-10-CM

## 2022-09-06 DIAGNOSIS — T50.905A ADVERSE EFFECT OF UNSPECIFIED DRUGS, MEDICAMENTS AND BIOLOGICAL SUBSTANCES, INITIAL ENCOUNTER: ICD-10-CM

## 2022-09-06 DIAGNOSIS — M79.646 PAIN IN UNSPECIFIED FINGER(S): ICD-10-CM

## 2022-09-06 DIAGNOSIS — M79.605 PAIN IN LEFT LEG: ICD-10-CM

## 2022-09-06 DIAGNOSIS — Z12.72 ENCOUNTER FOR SCREENING FOR MALIGNANT NEOPLASM OF VAGINA: ICD-10-CM

## 2022-09-06 DIAGNOSIS — Z86.2 PERSONAL HISTORY OF DISEASES OF THE BLOOD AND BLOOD-FORMING ORGANS AND CERTAIN DISORDERS INVOLVING THE IMMUNE MECHANISM: ICD-10-CM

## 2022-09-06 DIAGNOSIS — M54.9 DORSALGIA, UNSPECIFIED: ICD-10-CM

## 2022-09-06 DIAGNOSIS — F07.81 POSTCONCUSSIONAL SYNDROME: ICD-10-CM

## 2022-09-06 DIAGNOSIS — Z87.2 PERSONAL HISTORY OF DISEASES OF THE SKIN AND SUBCUTANEOUS TISSUE: ICD-10-CM

## 2022-09-06 DIAGNOSIS — S06.0X0A CONCUSSION W/OUT LOSS OF CONSCIOUSNESS, INITIAL ENCOUNTER: ICD-10-CM

## 2022-09-06 DIAGNOSIS — L67.8 OTHER HAIR COLOR AND HAIR SHAFT ABNORMALITIES: ICD-10-CM

## 2022-09-06 DIAGNOSIS — Z78.9 OTHER SPECIFIED HEALTH STATUS: ICD-10-CM

## 2022-09-06 DIAGNOSIS — M25.531 PAIN IN RIGHT WRIST: ICD-10-CM

## 2022-09-06 DIAGNOSIS — R30.0 DYSURIA: ICD-10-CM

## 2022-09-06 DIAGNOSIS — M79.604 PAIN IN RIGHT LEG: ICD-10-CM

## 2022-09-06 DIAGNOSIS — L03.119 CELLULITIS OF UNSPECIFIED PART OF LIMB: ICD-10-CM

## 2022-09-06 DIAGNOSIS — Z86.19 PERSONAL HISTORY OF OTHER INFECTIOUS AND PARASITIC DISEASES: ICD-10-CM

## 2022-09-06 DIAGNOSIS — K62.5 HEMORRHAGE OF ANUS AND RECTUM: ICD-10-CM

## 2022-09-06 DIAGNOSIS — H65.00 ACUTE SEROUS OTITIS MEDIA, UNSPECIFIED EAR: ICD-10-CM

## 2022-09-06 DIAGNOSIS — B37.3 CANDIDIASIS OF VULVA AND VAGINA: ICD-10-CM

## 2022-09-06 DIAGNOSIS — B37.9 CANDIDIASIS, UNSPECIFIED: ICD-10-CM

## 2022-09-06 DIAGNOSIS — Z00.00 ENCOUNTER FOR GENERAL ADULT MEDICAL EXAMINATION W/OUT ABNORMAL FINDINGS: ICD-10-CM

## 2022-09-06 DIAGNOSIS — R68.82 DECREASED LIBIDO: ICD-10-CM

## 2022-09-06 DIAGNOSIS — L03.90 CELLULITIS, UNSPECIFIED: ICD-10-CM

## 2022-09-06 DIAGNOSIS — L29.0 PRURITUS ANI: ICD-10-CM

## 2022-09-06 DIAGNOSIS — Z02.9 ENCOUNTER FOR ADMINISTRATIVE EXAMINATIONS, UNSPECIFIED: ICD-10-CM

## 2022-09-06 DIAGNOSIS — M62.838 OTHER MUSCLE SPASM: ICD-10-CM

## 2022-09-06 DIAGNOSIS — N76.0 ACUTE VAGINITIS: ICD-10-CM

## 2022-09-06 DIAGNOSIS — Z86.69 PERSONAL HISTORY OF OTHER DISEASES OF THE NERVOUS SYSTEM AND SENSE ORGANS: ICD-10-CM

## 2022-09-06 DIAGNOSIS — M79.609 PAIN IN UNSPECIFIED LIMB: ICD-10-CM

## 2022-09-06 PROCEDURE — 90472 IMMUNIZATION ADMIN EACH ADD: CPT

## 2022-09-06 PROCEDURE — 90715 TDAP VACCINE 7 YRS/> IM: CPT

## 2022-09-06 PROCEDURE — G0008: CPT

## 2022-09-06 PROCEDURE — 90686 IIV4 VACC NO PRSV 0.5 ML IM: CPT

## 2022-09-06 PROCEDURE — 99395 PREV VISIT EST AGE 18-39: CPT | Mod: 25

## 2022-09-06 RX ORDER — FLUCONAZOLE 150 MG/1
150 TABLET ORAL
Qty: 2 | Refills: 0 | Status: DISCONTINUED | COMMUNITY
Start: 2022-04-22 | End: 2022-09-06

## 2022-09-06 NOTE — HEALTH RISK ASSESSMENT
[Audit-CScore] : 2 [Change in mental status noted] : No change in mental status noted [Language] : denies difficulty with language [Behavior] : denies difficulty with behavior [Learning/Retaining New Information] : denies difficulty learning/retaining new information [Handling Complex Tasks] : denies difficulty handling complex tasks [Spatial Ability and Orientation] : denies difficulty with spatial ability and orientation [Reports changes in hearing] : Reports no changes in hearing [Reports changes in vision] : Reports no changes in vision [Reports changes in dental health] : Reports no changes in dental health
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
## 2022-09-06 NOTE — ASSESSMENT
[FreeTextEntry1] : Although cognitive deficit from concussion seems mild and prognosis is good, is interested in neuropsychological evaluation and will refer to our concussion program.

## 2022-09-07 LAB
25(OH)D3 SERPL-MCNC: 29.6 NG/ML
ALBUMIN SERPL ELPH-MCNC: 5.2 G/DL
ALP BLD-CCNC: 112 U/L
ALT SERPL-CCNC: 13 U/L
ANION GAP SERPL CALC-SCNC: 13 MMOL/L
AST SERPL-CCNC: 20 U/L
BASOPHILS # BLD AUTO: 0.02 K/UL
BASOPHILS NFR BLD AUTO: 0.3 %
BILIRUB SERPL-MCNC: 0.3 MG/DL
BUN SERPL-MCNC: 20 MG/DL
CALCIUM SERPL-MCNC: 10.5 MG/DL
CHLORIDE SERPL-SCNC: 102 MMOL/L
CHOLEST SERPL-MCNC: 204 MG/DL
CO2 SERPL-SCNC: 25 MMOL/L
CREAT SERPL-MCNC: 0.97 MG/DL
EGFR: 79 ML/MIN/1.73M2
EOSINOPHIL # BLD AUTO: 0.27 K/UL
EOSINOPHIL NFR BLD AUTO: 4.7 %
ESTIMATED AVERAGE GLUCOSE: 105 MG/DL
GLUCOSE SERPL-MCNC: 78 MG/DL
HBA1C MFR BLD HPLC: 5.3 %
HCT VFR BLD CALC: 42.5 %
HDLC SERPL-MCNC: 75 MG/DL
HGB BLD-MCNC: 14.2 G/DL
IMM GRANULOCYTES NFR BLD AUTO: 0.2 %
LDLC SERPL CALC-MCNC: 117 MG/DL
LDLC SERPL DIRECT ASSAY-MCNC: 120 MG/DL
LYMPHOCYTES # BLD AUTO: 2.39 K/UL
LYMPHOCYTES NFR BLD AUTO: 41.5 %
MAN DIFF?: NORMAL
MCHC RBC-ENTMCNC: 31.5 PG
MCHC RBC-ENTMCNC: 33.4 GM/DL
MCV RBC AUTO: 94.2 FL
MONOCYTES # BLD AUTO: 0.5 K/UL
MONOCYTES NFR BLD AUTO: 8.7 %
NEUTROPHILS # BLD AUTO: 2.57 K/UL
NEUTROPHILS NFR BLD AUTO: 44.6 %
NONHDLC SERPL-MCNC: 129 MG/DL
PLATELET # BLD AUTO: 248 K/UL
POTASSIUM SERPL-SCNC: 4.8 MMOL/L
PROLACTIN SERPL-MCNC: 8.1 NG/ML
PROT SERPL-MCNC: 7.7 G/DL
RBC # BLD: 4.51 M/UL
RBC # FLD: 12.3 %
SODIUM SERPL-SCNC: 140 MMOL/L
TESTOST SERPL-MCNC: <2.5 NG/DL
TRIGL SERPL-MCNC: 59 MG/DL
TSH SERPL-ACNC: 1.64 UIU/ML
WBC # FLD AUTO: 5.76 K/UL

## 2022-09-12 ENCOUNTER — APPOINTMENT (OUTPATIENT)
Dept: OBGYN | Facility: CLINIC | Age: 32
End: 2022-09-12

## 2022-09-12 VITALS
HEIGHT: 64 IN | WEIGHT: 150 LBS | DIASTOLIC BLOOD PRESSURE: 64 MMHG | BODY MASS INDEX: 25.61 KG/M2 | SYSTOLIC BLOOD PRESSURE: 95 MMHG

## 2022-09-12 PROCEDURE — 99395 PREV VISIT EST AGE 18-39: CPT

## 2022-09-12 RX ORDER — LEVONORGESTREL AND ETHINYL ESTRADIOL AND ETHINYL ESTRADIOL 150-30(84)
0.15-0.03 &0.01 KIT ORAL
Qty: 1 | Refills: 3 | Status: ACTIVE | COMMUNITY
Start: 2022-09-12 | End: 1900-01-01

## 2022-09-12 NOTE — HISTORY OF PRESENT ILLNESS
[TextBox_4] : 30yo  presents for routine gyn exam. Still with low libido which she states started when she stopped getting her period.  Pt. is under care of endocrin and was prescribed COCPs but only takes them every 3 months because she doesn't want to have her period every month.  /same sex partner/declines sti screen.  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 From prior EMR:\par Demographics: Race: White Ethnicity:  or  Native Language: english Occupation: [employer] stationed - [employer] in [place]/ in Grad school grad in .\par : 4 Para: 2 0 2 2 \par OB History:  x2\par \par GYN History: No significant GYN history.\par GYN\par PCOS\par Menarche Age: 12 Cycle: 26 Duration: 7 Flow: normal/heavy Sexually Active  female partner (monogamous)/has 2 children from her X-\par \par Type of Contraception: Same sex partner\par PMH\par No significant past medical history.\par Surgical History: No significant surgical history.\par Allergies: Codeine, macrobid\par Current Medications: Prescribed/Suppliments/OTC OCP - not sure of name\par Medications Verified Medications Verified\par Last PAP: 2015 -- partials BV pos lm to call office dl 4/24/15\par PAP neg\par STD neg\par Last OB Sono: 2012\par Family Hx\par Diabetes: grandparents\par \par DVT\par Personal history of blood clots/DVT/PE: no\par Personal history of conditions causing increased risk of blood clots/DVT/PE: no\par Family history of blood clots/DVT/PE: no\par Family history of conditions causing increased risk of blood clots/DVT/PE: no [PapSmeardate] : 9/2021 [TextBox_31] : wnl

## 2022-09-12 NOTE — PLAN
[FreeTextEntry1] : HCM\par -SBE\par -pap & HPV\par -MVI, Calcium, Vit d\par -Weight/exercise\par \par Low libido/PCOS, pt. does not get period on her own and only wants to get period every 3 months\par -change to Seasonique (no estrogen free days)\par \par RTO 1 year\par

## 2022-09-18 LAB — HPV HIGH+LOW RISK DNA PNL CVX: NOT DETECTED

## 2022-09-27 LAB — CYTOLOGY CVX/VAG DOC THIN PREP: NORMAL

## 2022-10-12 ENCOUNTER — APPOINTMENT (OUTPATIENT)
Dept: PSYCHIATRY | Facility: CLINIC | Age: 32
End: 2022-10-12

## 2022-10-12 PROCEDURE — 99214 OFFICE O/P EST MOD 30 MIN: CPT | Mod: 95
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
## 2022-10-12 NOTE — HISTORY OF PRESENT ILLNESS
[Medical Office: ([facility])___] : at the medical office located in  [Verbal consent obtained from patient] : the patient, [unfilled] [de-identified] : Patient reports that she is not doing that and after one week she started. Patient reported that she felt the Latuda increase twins are lady, she could not focus and felt that her mind was racing. Patient reports that she started having trouble falling asleep again about a week ago so she started taking Seroquel have a tablet and that helps with sleep. Patient reports she could not sleep because she had a manic episode with increased energy, decreased need for sleep feeling elated euphoric and impulsive. Patient denies any symptoms of PTSD exacerbation of this time.\par \par Patient reports that she could not tolerate her to the stop taking it and that she has not had severe episodes of depression but she's continued to have some mood swings and more lately had a manic episode which of course was the reason that she was not able to sleep until she was not on Latuda that she went back to taking Seroquel 25 mg at bedtime good effect.\par \par Denies excess ETOH use/abuse. \par Denies cannabis and other substance use/abuse\par No new medical issues, no new medication since last visit\par Menstrual periods: regular\par \par  [FreeTextEntry1] : Patient reported she is "doing really good".  She reported no symptoms of grace or hypomania also denied feeling depressed. \par Patient reported that she is sleeping about 7 hours at night however after taking the Seroquel it is still taking her several hours to fall asleep whereas initially when she started taking Seroquel she was able to fall asleep within a half an hour to an hour.  Patient reported that even though she does not feel tired in the day she is not getting restful sleep because she is up at least once or twice in the middle of the night and tossing and turning.  Patient denied any side effects from the medication.\par Patient denies any symptoms of PTSD exacerbation of this time.\par \par She denied excess ETOH use/abuse. \par She denied cannabis and other substance use/abuse\par No new medical issues, no new medication since last visit\par Menstrual periods: regular\par

## 2022-10-12 NOTE — PLAN
[No] : No [Medication education provided] : Medication education provided. [Rationale for medication choices, possible risks/precautions, benefits, alternative treatment choices, and consequences of non-treatment discussed] : Rationale for medication choices, possible risks/precautions, benefits, alternative treatment choices, and consequences of non-treatment discussed with patient/family/caregiver  [FreeTextEntry5] : Psychoeducation and supportive therapy provided,  and discussed rationale for recommended med changes \par Behavior activation\par Sleep hygiene education\par Medication: Increase Seroquel to 150 mg at bedtime \par Continue Lamictal 150 mg/day\par Educated patient of importance of remaining abstinent from drugs and alcohol. \par Emergency procedures were discussed: pt. educated to call 911 or go to nearest ER for worsening of symptoms/suicidal/homicidal ideation. \par individual psychotherapy to learn coping skills\par RTC in 6-8 weeks or earlier as needed \par Patient given opportunity to ask questions and their questions were answered and they expressed understanding and agreement with above plan.\par \par I stop not checked, no controlled substance Rx given today\par

## 2022-11-08 ENCOUNTER — APPOINTMENT (OUTPATIENT)
Dept: PSYCHIATRY | Facility: CLINIC | Age: 32
End: 2022-11-08

## 2022-11-08 PROCEDURE — 99214 OFFICE O/P EST MOD 30 MIN: CPT

## 2022-11-12 NOTE — HISTORY OF PRESENT ILLNESS
[FreeTextEntry1] : Patient reported she is doing good with stable mood and reported no symptoms of grace or hypomania also denied feeling depressed. \par Patient reported that she is sleeping about 7 hours at night, and she did not increase Seroquel dose, and has stopped drinking coffee, and switched to tea, working out and doing yoga regularly. Patient denies any symptoms of PTSD exacerbation of this time.\par She denied excess ETOH use/abuse. \par She denied cannabis and other substance use/abuse\par No new medical issues, no new medication since last visit\par Menstrual periods: regular\par \par Patient states she was due for her annual physical and medical exam for her jaw which is the time [employer] and she have to disclose the medications that she is taking and the diagnoses for which she is taking these medications.  Patient reported that she was suspended from work because they told her that the bipolar disorder diagnosis disqualifies her.  Patient states she wants to know if a provider could provide documentation that she does not have bipolar disorder.  Reviewed with the patient her diagnosis and is bipolar disorder because of the history of hypomanic episodes and depressive episodes and she has both diagnoses of bipolar disorder and PTSD and the current medications that she is being prescribed are the diagnosis of bipolar disorder management.  Patient was informed that the provider is not able to remove the diagnosis of bipolar disorder.  Patient initially thought that that provider may not complete the paperwork because she did not know whether this would help or not as the bipolar diagnosis would have to be mentioned and if been whether she would have to lose her job.  Patient was advised that if she wanted she is welcome to get a second opinion however the provider is unable to change the diagnosis.  Patient reported that she spoke to her command and they wanted the provider to complete the paperwork anyway.\par

## 2022-11-12 NOTE — DISCUSSION/SUMMARY
[FreeTextEntry1] : The patient is a 30 o woman with hx of episodes with sx consistent with major depressive episodes and hypomania, generalized anxiety disorder, childhood trauma and trauma from  experience meets criteria for PTSD\par \par 12/06/2021: Patient reports she still continues to feel depressed, no symptoms of hypomania since last visit, reports Seroquel is causing excessive sedation, and she is sleeping better without Seroquel.  She appears to be tolerating lamotrigine, possible fatigue from it, we discussed patient take lamotrigine earlier in the evening to reduced fatigue in the morning, and to continue with dose increase after one more week of Lamictal 50 mg/day for increased therapeutic benefit. \par \par 1/03/2022: Patient reports improving symptoms of depression, denies any hypomania symptoms since last visit, reports increased sleep onset latency and nightmares and intrusive thoughts and anxiety from ongoing symptoms of PTSD.  Patient is able to fall asleep and stay asleep with Seroquel but reports excessive daytime sedation.\par \par 2/2/2022: Patient reports sustained improvement of symptoms of depression, denies grace or hypomania symptoms reports exercise is helping her sleep and she has taken quetiapine as needed with good effect and did not have a good experience taking trazodone.\par \par 4/26/2022: Patient reports that for past 3-3 and half weeks she is experiencing mood swings with episodes with symptoms consistent with hypomania and depression.\par \par 5/17/2022: \par Patient reports increasing Lamictal has not been helping much with the mixed symptoms depression and hypomania. Discussed with the patient augmenting Lamictal with Latuda to help with current symptoms.\par \par 7/27/2022: Discussed with patient miss versus benefits of choosing Seroquel for mood stabilization, treatment of depression and grace episodes even though they're mild and grief she continues to experience them and that the motor gene has helped partially. Patient position she is on medication to help manage her weight and she feels comfortable starting Seroquel.\par \par 10/12/2022: Patient reported that she has been feeling better, with a good mood stability and denied any symptoms of grace or hypomania since last visit and also denied symptoms of depression.  Patient reported that that she still has increased sleep onset latency and even though she is getting 7 hours of sleep she does not feel that it is a good quality sleep.  Discussed with the patient that for sleep or insomnia could trigger a mood instability and since she is tolerating Seroquel current dose without side effects at this time we will increase the dose for both sleep and maintaining mood stability.\par \par 11/08/2022: Patient reported that she stayed with the Seroquel 100 mg at bedtime and changing her caffeine intake and starting to exercise daily and do yoga has helped with her sleep and mood stability.

## 2022-12-07 ENCOUNTER — APPOINTMENT (OUTPATIENT)
Dept: PSYCHIATRY | Facility: CLINIC | Age: 32
End: 2022-12-07

## 2022-12-07 PROCEDURE — 99214 OFFICE O/P EST MOD 30 MIN: CPT | Mod: 95

## 2022-12-08 NOTE — DISCUSSION/SUMMARY
[FreeTextEntry1] : The patient is a 30 o woman with hx of episodes with sx consistent with major depressive episodes and hypomania, generalized anxiety disorder, childhood trauma and trauma from  experience meets criteria for PTSD\par \par 12/06/2021: Patient reports she still continues to feel depressed, no symptoms of hypomania since last visit, reports Seroquel is causing excessive sedation, and she is sleeping better without Seroquel.  She appears to be tolerating lamotrigine, possible fatigue from it, we discussed patient take lamotrigine earlier in the evening to reduced fatigue in the morning, and to continue with dose increase after one more week of Lamictal 50 mg/day for increased therapeutic benefit. \par \par 1/03/2022: Patient reports improving symptoms of depression, denies any hypomania symptoms since last visit, reports increased sleep onset latency and nightmares and intrusive thoughts and anxiety from ongoing symptoms of PTSD.  Patient is able to fall asleep and stay asleep with Seroquel but reports excessive daytime sedation.\par \par 2/2/2022: Patient reports sustained improvement of symptoms of depression, denies grace or hypomania symptoms reports exercise is helping her sleep and she has taken quetiapine as needed with good effect and did not have a good experience taking trazodone.\par \par 4/26/2022: Patient reports that for past 3-3 and half weeks she is experiencing mood swings with episodes with symptoms consistent with hypomania and depression.\par \par 5/17/2022: \par Patient reports increasing Lamictal has not been helping much with the mixed symptoms depression and hypomania. Discussed with the patient augmenting Lamictal with Latuda to help with current symptoms.\par \par 7/27/2022: Discussed with patient miss versus benefits of choosing Seroquel for mood stabilization, treatment of depression and grace episodes even though they're mild and grief she continues to experience them and that the motor gene has helped partially. Patient position she is on medication to help manage her weight and she feels comfortable starting Seroquel.\par \par 10/12/2022: Patient reported that she has been feeling better, with a good mood stability and denied any symptoms of grace or hypomania since last visit and also denied symptoms of depression.  Patient reported that that she still has increased sleep onset latency and even though she is getting 7 hours of sleep she does not feel that it is a good quality sleep.  Discussed with the patient that for sleep or insomnia could trigger a mood instability and since she is tolerating Seroquel current dose without side effects at this time we will increase the dose for both sleep and maintaining mood stability.\par \par 11/08/2022: Patient reported that she stayed with the Seroquel 100 mg at bedtime and changing her caffeine intake and starting to exercise daily and do yoga has helped with her sleep and mood stability.  \par \par 12/2/2022: Patient reported that after last visit she decided to stop taking medications because she did not want her psychiatric diagnosis and medications to be interfering with her ability to continue with her employment and she feels at this time with therapy her symptoms have remained in good control and denied any symptoms of depression, grace or hypomania or PTSD since last visit.

## 2022-12-08 NOTE — PLAN
[FreeTextEntry5] : Psychoeducation and supportive therapy provided,  and discussed rationale for recommended meds and Education provided to patient about risk of relapse of her mood disorder if she is not on any medications and sleep deprivation could trigger a manic or hypomanic episode and both depressive episodes and hypomanic or manic episodes could affect her functioning and quality of life if they were not in control without medications.  Patient expressed understanding and reported that she would rather take a risk of relapse and not be on medications at this time and jeopardize her employment. \par Medication: Patient self discontinued Seroquel and lamotrigine and prefers to not to take medications at this time, continue individual therapy and states she prefers to monitor for reemergence of symptoms and may then consider resuming medications if therapy alone is not effective\par Emergency procedures were discussed: pt. educated to call 911 or go to nearest ER for worsening of symptoms/suicidal/homicidal ideation. \par individual psychotherapy to learn coping skills\par Patient was offered to schedule a follow-up appointment in 1 to 2 months to reassess the need for resuming medications if she were experiencing reemergence of symptoms however patient preferred not to make an appointment at this time and reported that she would like to discontinue treatment at this time and may consider reestablishing care in the future if she needed medication management.\par Patient given opportunity to ask questions and their questions were answered and they expressed understanding and agreement with above plan.\par \par

## 2022-12-08 NOTE — HISTORY OF PRESENT ILLNESS
[Home] : at home, [unfilled] , at the time of the visit. [FreeTextEntry1] : This AV Visit was conducted using [employer] platform\par \par Patient reported she submitted the paperwork regarding documentation of her psychiatric diagnosis and medications at work and she states that the review of her employment status is in process.  Patient states that she decided that she would not take medications because she is concerned that having to take those medications and having the diagnosis would jeopardize her employment as bipolar diagnosis is disqualifying and she will continue with therapy.  Patient states she stopped taking medications after last visit.  Patient reported that she is finding that sleeping is difficult and it is taking her a long time to fall asleep and she is only sleeping about 4 hours.  Patient states at this time she feels her mood is stable, denied any symptoms of depression or grace or hypomania.  She also reported that the PTSD symptoms continue to remain in good control. Education provided to patient about risk of relapse of her mood disorder if she is not on any medications and sleep deprivation could trigger a manic or hypomanic episode and both depressive episodes and hypomanic or manic episodes could affect her functioning and quality of life if they were not in control without medications.  Patient expressed understanding and reported that she would rather take a risk of relapse and not be on medications at this time and jeopardize her employment. \par She denied excess ETOH use/abuse. \par She denied cannabis and other substance use/abuse\par And states she is continuing to see her therapist regularly and exercise and eat healthy and hoping that would keep her symptoms in good control\par No new medical issues, no new medication since last visit\par Menstrual periods: regular\par \par

## 2022-12-16 ENCOUNTER — APPOINTMENT (OUTPATIENT)
Dept: PSYCHIATRY | Facility: CLINIC | Age: 32
End: 2022-12-16

## 2022-12-16 DIAGNOSIS — G47.00 INSOMNIA, UNSPECIFIED: ICD-10-CM

## 2022-12-16 PROCEDURE — 99214 OFFICE O/P EST MOD 30 MIN: CPT | Mod: 95

## 2022-12-16 NOTE — DISCUSSION/SUMMARY
[FreeTextEntry1] : The patient is a 30 o woman with hx of episodes with sx consistent with major depressive episodes and hypomania, generalized anxiety disorder, childhood trauma and trauma from  experience meets criteria for PTSD\par \par 12/06/2021: Patient reports she still continues to feel depressed, no symptoms of hypomania since last visit, reports Seroquel is causing excessive sedation, and she is sleeping better without Seroquel.  She appears to be tolerating lamotrigine, possible fatigue from it, we discussed patient take lamotrigine earlier in the evening to reduced fatigue in the morning, and to continue with dose increase after one more week of Lamictal 50 mg/day for increased therapeutic benefit. \par \par 1/03/2022: Patient reports improving symptoms of depression, denies any hypomania symptoms since last visit, reports increased sleep onset latency and nightmares and intrusive thoughts and anxiety from ongoing symptoms of PTSD.  Patient is able to fall asleep and stay asleep with Seroquel but reports excessive daytime sedation.\par \par 2/2/2022: Patient reports sustained improvement of symptoms of depression, denies grace or hypomania symptoms reports exercise is helping her sleep and she has taken quetiapine as needed with good effect and did not have a good experience taking trazodone.\par \par 4/26/2022: Patient reports that for past 3-3 and half weeks she is experiencing mood swings with episodes with symptoms consistent with hypomania and depression.\par \par 5/17/2022: \par Patient reports increasing Lamictal has not been helping much with the mixed symptoms depression and hypomania. Discussed with the patient augmenting Lamictal with Latuda to help with current symptoms.\par \par 7/27/2022: Discussed with patient miss versus benefits of choosing Seroquel for mood stabilization, treatment of depression and grace episodes even though they're mild and grief she continues to experience them and that the motor gene has helped partially. Patient position she is on medication to help manage her weight and she feels comfortable starting Seroquel.\par \par 10/12/2022: Patient reported that she has been feeling better, with a good mood stability and denied any symptoms of grace or hypomania since last visit and also denied symptoms of depression.  Patient reported that that she still has increased sleep onset latency and even though she is getting 7 hours of sleep she does not feel that it is a good quality sleep.  Discussed with the patient that for sleep or insomnia could trigger a mood instability and since she is tolerating Seroquel current dose without side effects at this time we will increase the dose for both sleep and maintaining mood stability.\par \par 11/08/2022: Patient reported that she stayed with the Seroquel 100 mg at bedtime and changing her caffeine intake and starting to exercise daily and do yoga has helped with her sleep and mood stability.  \par \par 12/2/2022: Patient reported that after last visit she decided to stop taking medications because she did not want her psychiatric diagnosis and medications to be interfering with her ability to continue with her employment and she feels at this time with therapy her symptoms have remained in good control and denied any symptoms of depression, grace or hypomania or PTSD since last visit.\par \par 12/16/2022: Patient reported that she is going to be discharged from  and she is accepting and coming to terms with that and looking for how she could move forward but also get the support and benefit to service connection.  Patient reported that she is not sleeping well and her mood has not been stable since she came off the medication and she would like to resume both medications.

## 2022-12-16 NOTE — PLAN
[No] : No [Medication education provided] : Medication education provided. [Rationale for medication choices, possible risks/precautions, benefits, alternative treatment choices, and consequences of non-treatment discussed] : Rationale for medication choices, possible risks/precautions, benefits, alternative treatment choices, and consequences of non-treatment discussed with patient/family/caregiver  [FreeTextEntry5] : Psychoeducation and supportive therapy provided,  and discussed rationale for recommended meds\par Medications: Start lamotrigine 25 mg daily for 2 weeks then 50 mg daily for 2 weeks then increase to 200 mg daily for 1 week and then go back to 150 mg daily, it was the previous dose that she was taking and felt more stable at this dose.  Also resume Seroquel and start with 50 mg at bedtime and increase the dose accordingly and the last time she was on Seroquel she was on 150 mg daily at bedtime with good effect.\par Emergency procedures were discussed: pt. educated to call 911 or go to nearest ER for worsening of symptoms/suicidal/homicidal ideation. \par individual psychotherapy to learn coping skills\par RTC in 4-6 weeks or earlier as needed\par Patient given opportunity to ask questions and their questions were answered and they expressed understanding and agreement with above plan.\par \par

## 2022-12-16 NOTE — HISTORY OF PRESENT ILLNESS
[Home] : at home, [unfilled] , at the time of the visit. [Medical Office: ([facility])___] : at the medical office located in  [Verbal consent obtained from patient] : the patient, [unfilled] [FreeTextEntry1] : This AV Visit was conducted using [employer] platform\par \par Patient reported she was told that the  will discharge her because having a diagnosis of bipolar disorder is disqualifying.  Patient states that now she is being told that this is not service-connected and that she is appealing with [employer] and veterans affairs to get service connected.  Patient reported that she can continue seeing her therapist and also her spouse is in the  and she has medical insurance coverage.  Patient reported that stopping medications "was not a good decision".  Patient states that she is starting to experience worsening sleep problems and mood changes and she wants to go back on medications and wanted to know how to resume medications as she has not been taking medications for over 6 weeks.\par Patient denied passive or active suicidal ideation.  Patient denied feeling hopeless or helpless.  Patient states she has 1 year to figure out what she would like to do and in the meantime she is going to continue to work on service connection for her mental health problems [facility] the  and [facility].\par She denied excess ETOH use/abuse. \par She denied cannabis and other substance use/abuse\par No new medical issues, no new medication since last visit\par Menstrual periods: regular\par \par

## 2022-12-16 NOTE — PHYSICAL EXAM
[None] : none [Average] : average [Cooperative] : cooperative [Clear] : clear [Linear/Goal Directed] : linear/goal directed [WNL] : within normal limits [None Reported] : none reported [FreeTextEntry1] : looks tired, and hair not combed [FreeTextEntry8] : "not that good" [de-identified] : slightly restricted  [de-identified] : except for change in voice due to nasal congestion  [de-identified] : No Si/HI

## 2023-01-26 NOTE — HISTORY OF PRESENT ILLNESS
[Home] : at home, [unfilled] , at the time of the visit. [Medical Office: ([facility])___] : at the medical office located in  [Verbal consent obtained from patient] : the patient, [unfilled] [FreeTextEntry1] : This AV Visit was conducted using [employer] platform\par \par Patient reported she was told that the  will discharge her because having a diagnosis of bipolar disorder is disqualifying.  Patient states that now she is being told that this is not service-connected and that she is appealing with [employer] and veterans affairs to get service connected.  Patient reported that she can continue seeing her therapist and also her spouse is in the  and she has medical insurance coverage.  Patient reported that stopping medications "was not a good decision".  Patient states that she is starting to experience worsening sleep problems and mood changes and she wants to go back on medications and wanted to know how to resume medications as she has not been taking medications for over 6 weeks.\par Patient denied passive or active suicidal ideation.  Patient denied feeling hopeless or helpless.  Patient states she has 1 year to figure out what she would like to do and in the meantime she is going to continue to work on service connection for her mental health problems through the  and [facility].\par She denied excess ETOH use/abuse. \par She denied cannabis and other substance use/abuse\par No new medical issues, no new medication since last visit\par Menstrual periods: regular\par \par

## 2023-01-26 NOTE — PHYSICAL EXAM
[None] : none [Average] : average [Cooperative] : cooperative [Clear] : clear [Linear/Goal Directed] : linear/goal directed [None Reported] : none reported [WNL] : within normal limits [FreeTextEntry1] : looks tired, and hair not combed [FreeTextEntry8] : "not that good" [de-identified] : slightly restricted  [de-identified] : except for change in voice due to nasal congestion  [de-identified] : No Si/HI

## 2023-01-27 ENCOUNTER — APPOINTMENT (OUTPATIENT)
Dept: PSYCHIATRY | Facility: CLINIC | Age: 33
End: 2023-01-27

## 2023-01-27 RX ORDER — LAMOTRIGINE 100 MG/1
100 TABLET ORAL
Qty: 30 | Refills: 0 | Status: DISCONTINUED | COMMUNITY
Start: 2022-12-16 | End: 2023-01-27

## 2023-01-27 RX ORDER — QUETIAPINE FUMARATE 150 MG/1
150 TABLET, FILM COATED ORAL
Qty: 30 | Refills: 2 | Status: DISCONTINUED | COMMUNITY
Start: 2022-07-27 | End: 2023-01-27

## 2023-01-27 RX ORDER — LAMOTRIGINE 25 MG/1
25 TABLET ORAL
Qty: 45 | Refills: 0 | Status: DISCONTINUED | COMMUNITY
Start: 2022-12-16 | End: 2023-01-27

## 2023-01-27 NOTE — DISCUSSION/SUMMARY
[FreeTextEntry1] : Patient had a telehealth appointment scheduled today at 11 AM.  When asked where patient was located at the time of the appointment she reported that she was in [facility] attending an EMDR training sessions.  Patient was informed that she has to be present in [place] at the time of the telehealth visit.  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  Patient reported that she is feeling much better after restarting lamotrigine.  Patient states she continue taking Seroquel 50 mg at bedtime because she was sleeping better and also her mood was much improved and stable and she was not experiencing any symptoms of depression or grace or hypomania.\par Patient states that the discharge process from /[employer] job is ongoing and she anticipates may take up to a year.  Patient reported that she prefers to continue taking lamotrigine 150 mg daily and Seroquel 50 mg at bedtime.  Agree with patient preference and advised patient to continue to monitor her mood symptoms and weight we will consider increasing Seroquel dose if she were starting to experience return of depression or grace or hypomania symptoms.\par Prescriptions refills for lamotrigine 150 mg daily and Seroquel 50 mg at bedtime percent to the patient preferred pharmacy.\par Patient advised to call the office to reschedule a follow-up appointment in 6 weeks.

## 2023-03-10 ENCOUNTER — APPOINTMENT (OUTPATIENT)
Dept: PSYCHIATRY | Facility: CLINIC | Age: 33
End: 2023-03-10

## 2023-03-21 ENCOUNTER — APPOINTMENT (OUTPATIENT)
Dept: ENDOCRINOLOGY | Facility: CLINIC | Age: 33
End: 2023-03-21
Payer: OTHER GOVERNMENT

## 2023-03-21 VITALS
WEIGHT: 150 LBS | BODY MASS INDEX: 25.61 KG/M2 | OXYGEN SATURATION: 99 % | DIASTOLIC BLOOD PRESSURE: 70 MMHG | HEIGHT: 64 IN | SYSTOLIC BLOOD PRESSURE: 98 MMHG | HEART RATE: 82 BPM

## 2023-03-21 DIAGNOSIS — E55.9 VITAMIN D DEFICIENCY, UNSPECIFIED: ICD-10-CM

## 2023-03-21 DIAGNOSIS — B37.2 CANDIDIASIS OF SKIN AND NAIL: ICD-10-CM

## 2023-03-21 PROCEDURE — 99214 OFFICE O/P EST MOD 30 MIN: CPT

## 2023-03-21 RX ORDER — DESOGESTREL AND ETHINYL ESTRADIOL 0.15-0.03
0.15-3 KIT ORAL
Qty: 28 | Refills: 0 | Status: DISCONTINUED | COMMUNITY
Start: 2021-09-07 | End: 2023-03-21

## 2023-03-21 RX ORDER — NYSTATIN 100000 1/G
100000 POWDER TOPICAL 3 TIMES DAILY
Qty: 1 | Refills: 0 | Status: ACTIVE | COMMUNITY
Start: 2023-03-21 | End: 1900-01-01

## 2023-03-21 NOTE — ASSESSMENT
[FreeTextEntry1] : Patient is a 31 -year-old woman with history of polycystic ovarian syndrome, here for endocrinology follow-up.\par \par 1.  Secondary amenorrhea/PCOS\par Patient meets 2 out of 3 [place] criteria for PCOS: Irregular menses and clinical symptoms of hyperandrogenism, hirsutism\par Patient had hormonal work-up which showed a normal TSH level 1.7; estradiol level of 31; testosterone 1105.7: Prolactin level 17.9: FSH is 9.4, LH 57.8; and normal 17 hydroxyprogesterone level of 51: DHEA-S level is 56\par Patient wanted to avoid birth control as she is trying to get pregnant.  She was started on Metformin.\par Patient is also seeing weight loss doctor and she is using semaglutide orally.\par Continue follow-up with OB/GYN; going to see ob/gyn soon. \par Currently on Isibloom 0.15-30 mg-mcg, now with regular menses. \par \par 2.  History of galactorrhea\par Repeat prolactin level has been normal.  Her menses has been regular on OCP. \par She is no longer getting discharge from the breast.  \par Check Prolactin level today. \par \par 3.  Obesity\par Patient is following up with [facility] obesity medicine.\par He is currently on Rybelsus 7 mg once weekly.\par Continue with diet and exercise\par Will continue to follow up with weight management.\par We will check insulin level\par Given PCOS, may have insulin resistance.\par Check A1c level.\par \par 4. Decreased sexual drive\par May be related to trauma secondary to prior  deployment.  \par Patient following up with psychiatry, to discuss flibanserin (Addyi)\par \par \par \par \par

## 2023-03-21 NOTE — HISTORY OF PRESENT ILLNESS
[FreeTextEntry1] : 32-year-old woman with history of PCOS, here for endocrinology follow-up.\par Decreased sex drive.\par \par Menarche at age 9.  Had regular menses.  Her menses became irregular in November 2019.  She was on deployment from the .  She returned in February 2019.  She is sexually active with 1 female partner, her wife.  No other partners.  She had no history of infertility issues.  She has 2 children, vaginal delivery.\par \par Physical reactive.\par \par Patient has history of galactorrhea bilaterally, had breast ultrasound with OB/GYN which is within normal limits.  Increased facial hair on the side of the face and stomach.  Always had facial hair and on chin and back.\par \par Her weight has been stable.\par \par Following up with psychiatry for depression, bipolar disorder.\par \par She is on Rybelsus 7mg for weight management.  But also taking Lamotrigine 150mg once daily.

## 2023-03-23 ENCOUNTER — NON-APPOINTMENT (OUTPATIENT)
Age: 33
End: 2023-03-23

## 2023-03-23 LAB
25(OH)D3 SERPL-MCNC: 20.5 NG/ML
ESTIMATED AVERAGE GLUCOSE: 103 MG/DL
HBA1C MFR BLD HPLC: 5.2 %
INSULIN SERPL-MCNC: 18.8 UU/ML
PROLACTIN SERPL-MCNC: 9.5 NG/ML

## 2023-04-05 ENCOUNTER — APPOINTMENT (OUTPATIENT)
Dept: PSYCHIATRY | Facility: CLINIC | Age: 33
End: 2023-04-05
Payer: OTHER GOVERNMENT

## 2023-04-05 PROCEDURE — 99214 OFFICE O/P EST MOD 30 MIN: CPT | Mod: 95

## 2023-04-05 NOTE — HISTORY OF PRESENT ILLNESS
[FreeTextEntry1] : This AV Visit was conducted using viavoo platform\par \par Patient reported she is "doing okay".  Patient states that she will be officially discharged from the  in July and then she will have to start working on the disability and service connection.  Patient states that about a month ago she developed a rash under her arms breast and between the 2 buttocks.  Patient states that she spoke to her endocrinologist who thought it could be a fungal infection and gave her topical antifungal powder.  Patient states that she wanted to discuss this because she was concerned about lamotrigine could be a cause for this.  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  Patient was advised to continue to use the antifungal powder if there is no relief and/or the rash is spreading then she reports she should see the dermatologist for further evaluation and treatment. \par Patient reported that she has reduced libido because of PCOS and that is a chronic problem and her endocrinologist suggested that she should talk to her psychiatrist about filbansarine.  Patient informed that there is a drug-drug interaction between Seroquel and  filbansarine- increasing the dopamine blockade and increasing the chance for an MS.  Patient also was informed that less likely the medication that she is taking is contributing to sexual side effects and that this provider is not familiar with the prescribing filbansarine for reduced libido and she should speak with her gynecologist regarding this issue.\par \par Patient reported that after she completed her school recently in the past few weeks she is finding herself getting distracted easily unable to focus and sometimes being impulsive even though would not acting impulsively or going on spending sprees and her sleep she states is pretty good and she is not fatigued or having any difficulty waking up in the morning.  Patient states that she had stopped taking Rybelsus because it seemed like when the psychotropic medications were started while she was on "fell Rybelsus she was having increased craving for sweets and that after she stopped it that has resolved.\par She denied excess ETOH use/abuse. \par She denied cannabis and other substance use/abuse\par No new medical issues, no new medication since last visit\par Menstrual periods: regular\par \par Informed patient that the lamotrigine when there is no fatigue in the daytime are least likely medications to be causing cognitive impairment or side effects and if she does think that it started after lamotrigine we can lower the lamotrigine dose and assess.  Patient states that she is feeling really good and stable on the current medication and does not want to make any changes and we will continue to monitor.\par \par

## 2023-04-05 NOTE — HISTORY OF PRESENT ILLNESS
[FreeTextEntry1] : This AV Visit was conducted using [employer] platform\par \par Patient reported she is "doing okay".  Patient states that she will be officially discharged from the  in July and then she will have to start working on the disability and service connection.  Patient states that about a month ago she developed a rash under her arms breast and between the 2 buttocks.  Patient states that she spoke to her endocrinologist who thought it could be a fungal infection and gave her topical antifungal powder.  Patient states that she wanted to discuss this because she was concerned about lamotrigine could be a cause for this.  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  Patient was advised to continue to use the antifungal powder if there is no relief and/or the rash is spreading then she reports she should see the dermatologist for further evaluation and treatment. \par Patient reported that she has reduced libido because of PCOS and that is a chronic problem and her endocrinologist suggested that she should talk to her psychiatrist about filbansarine.  Patient informed that there is a drug-drug interaction between Seroquel and  filbansarine- increasing the dopamine blockade and increasing the chance for an MS.  Patient also was informed that less likely the medication that she is taking is contributing to sexual side effects and that this provider is not familiar with the prescribing filbansarine for reduced libido and she should speak with her gynecologist regarding this issue.\par \par Patient reported that after she completed her school recently in the past few weeks she is finding herself getting distracted easily unable to focus and sometimes being impulsive even though would not acting impulsively or going on spending sprees and her sleep she states is pretty good and she is not fatigued or having any difficulty waking up in the morning.  Patient states that she had stopped taking Rybelsus because it seemed like when the psychotropic medications were started while she was on "fell Rybelsus she was having increased craving for sweets and that after she stopped it that has resolved.\par She denied excess ETOH use/abuse. \par She denied cannabis and other substance use/abuse\par No new medical issues, no new medication since last visit\par Menstrual periods: regular\par \par Informed patient that the lamotrigine when there is no fatigue in the daytime are least likely medications to be causing cognitive impairment or side effects and if she does think that it started after lamotrigine we can lower the lamotrigine dose and assess.  Patient states that she is feeling really good and stable on the current medication and does not want to make any changes and we will continue to monitor.\par \par
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
## 2023-04-05 NOTE — PLAN
[FreeTextEntry5] : Psychoeducation and supportive therapy provided,  and discussed rationale for recommended meds\par Medications: Continue lamotrigine 150 mg daily, Seroquel 50 mg at bedtime \par Emergency procedures were discussed: pt. educated to call 911 or go to nearest ER for worsening of symptoms/suicidal/homicidal ideation. \par individual psychotherapy to learn coping skills\par RTC in 3 months or earlier as needed\par Patient given opportunity to ask questions and their questions were answered and they expressed understanding and agreement with above plan.\par \par

## 2023-06-28 ENCOUNTER — APPOINTMENT (OUTPATIENT)
Dept: PSYCHIATRY | Facility: CLINIC | Age: 33
End: 2023-06-28
Payer: OTHER GOVERNMENT

## 2023-06-28 DIAGNOSIS — F43.10 POST-TRAUMATIC STRESS DISORDER, UNSPECIFIED: ICD-10-CM

## 2023-06-28 DIAGNOSIS — F31.81 BIPOLAR II DISORDER: ICD-10-CM

## 2023-06-28 PROCEDURE — 99214 OFFICE O/P EST MOD 30 MIN: CPT | Mod: 95

## 2023-06-28 RX ORDER — QUETIAPINE FUMARATE 50 MG/1
50 TABLET ORAL
Qty: 30 | Refills: 2 | Status: ACTIVE | COMMUNITY
Start: 2022-12-16 | End: 1900-01-01

## 2023-06-28 RX ORDER — LAMOTRIGINE 150 MG/1
150 TABLET ORAL DAILY
Qty: 30 | Refills: 2 | Status: ACTIVE | COMMUNITY
Start: 2021-11-15 | End: 1900-01-01

## 2023-06-28 NOTE — PLAN
[No] : No [Medication education provided] : Medication education provided. [Rationale for medication choices, possible risks/precautions, benefits, alternative treatment choices, and consequences of non-treatment discussed] : Rationale for medication choices, possible risks/precautions, benefits, alternative treatment choices, and consequences of non-treatment discussed with patient/family/caregiver  [FreeTextEntry5] : Psychoeducation and supportive therapy provided,  and discussed rationale for recommended meds\par Medications: Continue lamotrigine 150 mg daily, Seroquel 50 mg at bedtime \par Emergency procedures were discussed: pt. educated to call 911 or go to nearest ER for worsening of symptoms/suicidal/homicidal ideation. \par individual psychotherapy to learn coping skills\par RTC in 3 months or earlier as needed\par Patient given opportunity to ask questions and their questions were answered and they expressed understanding and agreement with above plan.\par \par

## 2023-06-28 NOTE — REASON FOR VISIT
[Patient] : Patient [Patient preference] : as per patient preference [Telehealth (audio & video) - Individual/Group] : This visit was provided via telehealth using real-time 2-way audio visual technology. [Medical Office: ([facility])___] : The provider was located at the medical office in [unfilled]. [Home] : The patient, [unfilled], was located at home, [unfilled], at the time of the visit. [Verbal consent obtained from patient/other participant(s)] : Verbal consent for telehealth/telephonic services obtained from patient/other participant(s) [FreeTextEntry1] : Bipolar disorder and PTSD

## 2023-06-28 NOTE — PHYSICAL EXAM
[None] : none [Average] : average [Cooperative] : cooperative [Euthymic] : euthymic [Clear] : clear [Full] : full [Linear/Goal Directed] : linear/goal directed [None Reported] : none reported [WNL] : within normal limits [FreeTextEntry7] : No SI/HI
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
## 2023-06-28 NOTE — HISTORY OF PRESENT ILLNESS
[Home] : at home, [unfilled] , at the time of the visit. [Medical Office: ([facility])___] : at the medical office located in  [Verbal consent obtained from patient] : the patient, [unfilled] [FreeTextEntry1] : This AV Visit was conducted using [employer] platform\par \par Patient reported she is "doing okay" since last visit with stable mood and denied any symptoms of depression, grace or hypomania.  She reported no acute symptoms of PTSD since last visit. Patient reported that she is following through filing paperwork for [facility] disability and service connection.  Patient had questions about the recent letter and the documentation sent to the VA disability rep and asked if this provider would continue to support her in getting service connection.  Patient was informed that this provider can provide information about her diagnoses and and her care with this provider since Nov 2021 and it is up to the independent evaluator from the VA to make any further assessment and judgment regarding her service connection and disability eligibility. \par She denied excess ETOH use/abuse. \par She denied cannabis and other substance use/abuse\par No new medical issues, no new medication since last visit\par Menstrual periods: regular\par \par

## 2023-07-31 ENCOUNTER — NON-APPOINTMENT (OUTPATIENT)
Age: 33
End: 2023-07-31

## 2023-10-17 ENCOUNTER — APPOINTMENT (OUTPATIENT)
Dept: ENDOCRINOLOGY | Facility: CLINIC | Age: 33
End: 2023-10-17
Payer: OTHER GOVERNMENT

## 2023-10-17 VITALS
HEART RATE: 80 BPM | HEIGHT: 64 IN | WEIGHT: 164 LBS | SYSTOLIC BLOOD PRESSURE: 120 MMHG | DIASTOLIC BLOOD PRESSURE: 78 MMHG | BODY MASS INDEX: 28 KG/M2 | OXYGEN SATURATION: 98 %

## 2023-10-17 PROCEDURE — 99214 OFFICE O/P EST MOD 30 MIN: CPT

## 2023-10-17 RX ORDER — ORAL SEMAGLUTIDE 7 MG/1
7 TABLET ORAL
Qty: 30 | Refills: 5 | Status: DISCONTINUED | COMMUNITY
Start: 2020-12-21 | End: 2023-10-17

## 2023-11-01 ENCOUNTER — NON-APPOINTMENT (OUTPATIENT)
Age: 33
End: 2023-11-01

## 2023-11-03 LAB
25(OH)D3 SERPL-MCNC: 16.1 NG/ML
ANION GAP SERPL CALC-SCNC: 9 MMOL/L
BUN SERPL-MCNC: 18 MG/DL
CALCIUM SERPL-MCNC: 10.2 MG/DL
CHLORIDE SERPL-SCNC: 103 MMOL/L
CO2 SERPL-SCNC: 27 MMOL/L
CREAT SERPL-MCNC: 0.97 MG/DL
DHEA-SULFATE, SERUM: 29 UG/DL
EGFR: 79 ML/MIN/1.73M2
ESTIMATED AVERAGE GLUCOSE: 103 MG/DL
ESTRADIOL SERPL-MCNC: 7 PG/ML
FSH SERPL-MCNC: 30.9 IU/L
GLUCOSE SERPL-MCNC: 83 MG/DL
HBA1C MFR BLD HPLC: 5.2 %
INSULIN SERPL-MCNC: 16.5 UU/ML
LH SERPL-ACNC: 99.6 IU/L
POTASSIUM SERPL-SCNC: 5.1 MMOL/L
SODIUM SERPL-SCNC: 139 MMOL/L
TESTOST FREE SERPL-MCNC: 0.2 PG/ML
TESTOST SERPL-MCNC: <2.5 NG/DL

## 2023-11-03 RX ORDER — CONJUGATED ESTROGENS AND MEDROXYPROGESTERONE ACETATE .3; 1.5 MG/1; MG/1
0.3-1.5 TABLET, SUGAR COATED ORAL DAILY
Qty: 1 | Refills: 11 | Status: ACTIVE | COMMUNITY
Start: 2023-11-03 | End: 1900-01-01

## 2023-11-06 RX ORDER — PROGESTERONE 200 MG/1
200 CAPSULE ORAL
Qty: 36 | Refills: 3 | Status: ACTIVE | COMMUNITY
Start: 2023-11-06 | End: 1900-01-01

## 2023-11-13 ENCOUNTER — NON-APPOINTMENT (OUTPATIENT)
Age: 33
End: 2023-11-13

## 2023-12-12 RX ORDER — ESTRADIOL 0.1 MG/D
0.1 PATCH TRANSDERMAL
Qty: 6 | Refills: 1 | Status: ACTIVE | COMMUNITY
Start: 2023-11-06 | End: 1900-01-01

## 2023-12-14 ENCOUNTER — APPOINTMENT (OUTPATIENT)
Dept: OBGYN | Facility: CLINIC | Age: 33
End: 2023-12-14
Payer: COMMERCIAL

## 2023-12-14 VITALS
DIASTOLIC BLOOD PRESSURE: 62 MMHG | RESPIRATION RATE: 16 BRPM | OXYGEN SATURATION: 98 % | HEART RATE: 92 BPM | SYSTOLIC BLOOD PRESSURE: 98 MMHG | TEMPERATURE: 98.6 F

## 2023-12-14 DIAGNOSIS — E29.1 TESTICULAR HYPOFUNCTION: ICD-10-CM

## 2023-12-14 DIAGNOSIS — N91.1 SECONDARY AMENORRHEA: ICD-10-CM

## 2023-12-14 DIAGNOSIS — R68.82 DECREASED LIBIDO: ICD-10-CM

## 2023-12-14 DIAGNOSIS — Z01.419 ENCOUNTER FOR GYNECOLOGICAL EXAMINATION (GENERAL) (ROUTINE) W/OUT ABNORMAL FINDINGS: ICD-10-CM

## 2023-12-14 DIAGNOSIS — N89.8 OTHER SPECIFIED NONINFLAMMATORY DISORDERS OF VAGINA: ICD-10-CM

## 2023-12-14 LAB
HCG UR QL: NEGATIVE
QUALITY CONTROL: YES

## 2023-12-14 PROCEDURE — 99213 OFFICE O/P EST LOW 20 MIN: CPT | Mod: 25

## 2023-12-14 PROCEDURE — 99395 PREV VISIT EST AGE 18-39: CPT

## 2023-12-14 RX ORDER — TESTOSTERONE CYPIONATE 100 %
POWDER (GRAM) MISCELLANEOUS
Qty: 1 | Refills: 0 | Status: ACTIVE | COMMUNITY
Start: 2023-12-14 | End: 1900-01-01

## 2023-12-14 RX ORDER — NORETHINDRONE ACETATE AND ETHINYL ESTRADIOL 1.5; 3 MG/1; UG/1
1.5-3 TABLET ORAL
Qty: 3 | Refills: 2 | Status: ACTIVE | COMMUNITY
Start: 2023-12-14 | End: 1900-01-01
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
## 2023-12-14 NOTE — PLAN
[FreeTextEntry1] : HCM -SBE -pap & HPV today -MVI, Calcium, Vit d -Weight/exercise Premature Ovarian Failure -Pt. does not like patch/pill method of HRT (scaring skin with patch/weight gain during progesterone phase) and prempro was not covered by insurance. Pt. to try Junel 1.5/30 continuous (no placebos).  If still with hot flashes will see if Bijuva is covered since pt is in same sex marriage and not worried about pregnancy.   -Discussed treatment with hormone replacement therapy (HRT) should be started and continued until at least the average age of menopause (51 years). Hormone replacement is important for treating symptoms and protecting against the long-term effects of estrogen deficiency including cardiovascular disease, osteoporosis, and cognitive impairment. Low Libido --will start testosterone through [facility]. Patient educated about the safe and effective use of medication and potential drug/food, drug/drug, drug/herb/vitamin interactions Discussed not FDA approved, nor covered by insurance.  -eRx testosterone 1mg daily apply to upper arm ([facility]) 3 month supply. Patient educated about the safe and effective use of medication and potential drug/food, drug/drug, drug/herb/vitamin interactions RTO 3 months

## 2023-12-14 NOTE — REVIEW OF SYSTEMS
[Patient Intake Form Reviewed] : Patient intake form was reviewed [Negative] : Heme/Lymph [FreeTextEntry8] : + vaginal odor [FreeTextEntry1] : low libido

## 2023-12-14 NOTE — PHYSICAL EXAM
[Chaperone Declined] : Patient declined chaperone [Appropriately responsive] : appropriately responsive [Alert] : alert [No Acute Distress] : no acute distress [No Lymphadenopathy] : no lymphadenopathy [Soft] : soft [Non-tender] : non-tender [Non-distended] : non-distended [No Lesions] : no lesions [No Mass] : no mass [Oriented x3] : oriented x3 [FreeTextEntry5] : Resp. rate wnl, color pink, no SOB [Examination Of The Breasts] : a normal appearance [No Masses] : no breast masses were palpable [Labia Majora] : normal [Labia Minora] : normal [Normal] : normal [Uterine Adnexae] : normal [FreeTextEntry4] : weak pelvic floor

## 2023-12-15 LAB
C TRACH RRNA SPEC QL NAA+PROBE: NOT DETECTED
HPV HIGH+LOW RISK DNA PNL CVX: NOT DETECTED
N GONORRHOEA RRNA SPEC QL NAA+PROBE: NOT DETECTED
SOURCE TP AMPLIFICATION: NORMAL

## 2023-12-19 LAB
BACTERIA UR CULT: NORMAL
CANDIDA VAG CYTO: NOT DETECTED
CYTOLOGY CVX/VAG DOC THIN PREP: NORMAL
G VAGINALIS+PREV SP MTYP VAG QL MICRO: NOT DETECTED
T VAGINALIS VAG QL WET PREP: NOT DETECTED

## 2023-12-27 ENCOUNTER — APPOINTMENT (OUTPATIENT)
Dept: ULTRASOUND IMAGING | Facility: CLINIC | Age: 33
End: 2023-12-27
Payer: COMMERCIAL

## 2023-12-27 PROCEDURE — 76830 TRANSVAGINAL US NON-OB: CPT

## 2023-12-27 PROCEDURE — 76856 US EXAM PELVIC COMPLETE: CPT

## 2024-01-11 ENCOUNTER — OUTPATIENT (OUTPATIENT)
Dept: OUTPATIENT SERVICES | Facility: HOSPITAL | Age: 34
LOS: 1 days | End: 2024-01-11
Payer: COMMERCIAL

## 2024-01-11 ENCOUNTER — APPOINTMENT (OUTPATIENT)
Dept: BARIATRICS/WEIGHT MGMT | Facility: CLINIC | Age: 34
End: 2024-01-11
Payer: COMMERCIAL

## 2024-01-11 VITALS — BODY MASS INDEX: 30.38 KG/M2 | WEIGHT: 177 LBS

## 2024-01-11 DIAGNOSIS — I10 ESSENTIAL (PRIMARY) HYPERTENSION: ICD-10-CM

## 2024-01-11 PROCEDURE — 99215 OFFICE O/P EST HI 40 MIN: CPT | Mod: 95

## 2024-01-11 PROCEDURE — G0463: CPT

## 2024-01-11 RX ORDER — PHENTERMINE HYDROCHLORIDE 37.5 MG/1
37.5 TABLET ORAL
Qty: 30 | Refills: 5 | Status: ACTIVE | COMMUNITY
Start: 2024-01-11 | End: 1900-01-01

## 2024-01-12 NOTE — HISTORY OF PRESENT ILLNESS
[Home] : at home, [unfilled] , at the time of the visit. [Other Location: e.g. Home (Enter Location, City,State)___] : at [unfilled] [Verbal consent obtained from patient] : the patient, [unfilled] [FreeTextEntry1] : Patient presents for weight loss and overweight/obese comorbidity management  nuno reports some weight gain after rybelsus no longer covered has been eating a low carb diet with ASF as source of energy limited red meat to a few times weekly although exact details sparse exercising 3x weekly amount of UPF in diet not discussed  Due to comorbidities this patient requires medical treatment for overweight / obesity

## 2024-01-12 NOTE — ASSESSMENT
[FreeTextEntry1] : Bariatric surgery history:none Overweight / obesity comorbidities: pcos Current anti-obesity medications: none Obesity medication side effects:n/a  -key nutrition concepts reviewed S/V meals primarily; avoid low CHO nonketogenic high fat pattern -start phentermine for now, counseled on use, tried previously -start zepbound if covered, and will stop phentermine at that time -start 4 week follow ups for dose adjustment pending zepbound approval

## 2024-01-15 RX ORDER — TIRZEPATIDE 2.5 MG/.5ML
2.5 INJECTION, SOLUTION SUBCUTANEOUS
Qty: 1 | Refills: 5 | Status: ACTIVE | COMMUNITY
Start: 2024-01-15 | End: 1900-01-01

## 2024-01-23 DIAGNOSIS — E66.9 OBESITY, UNSPECIFIED: ICD-10-CM

## 2024-01-23 DIAGNOSIS — E28.2 POLYCYSTIC OVARIAN SYNDROME: ICD-10-CM

## 2024-02-15 ENCOUNTER — APPOINTMENT (OUTPATIENT)
Dept: BARIATRICS/WEIGHT MGMT | Facility: CLINIC | Age: 34
End: 2024-02-15
Payer: OTHER GOVERNMENT

## 2024-02-15 ENCOUNTER — OUTPATIENT (OUTPATIENT)
Dept: OUTPATIENT SERVICES | Facility: HOSPITAL | Age: 34
LOS: 1 days | End: 2024-02-15
Payer: OTHER GOVERNMENT

## 2024-02-15 VITALS — BODY MASS INDEX: 29.35 KG/M2 | WEIGHT: 171 LBS

## 2024-02-15 DIAGNOSIS — E66.9 OBESITY, UNSPECIFIED: ICD-10-CM

## 2024-02-15 DIAGNOSIS — E78.5 HYPERLIPIDEMIA, UNSPECIFIED: ICD-10-CM

## 2024-02-15 DIAGNOSIS — I10 ESSENTIAL (PRIMARY) HYPERTENSION: ICD-10-CM

## 2024-02-15 PROCEDURE — G0463: CPT

## 2024-02-15 PROCEDURE — 99214 OFFICE O/P EST MOD 30 MIN: CPT | Mod: 95

## 2024-02-15 RX ORDER — ORAL SEMAGLUTIDE 7 MG/1
7 TABLET ORAL
Qty: 30 | Refills: 5 | Status: ACTIVE | OUTPATIENT
Start: 2024-02-15

## 2024-02-15 RX ORDER — ORAL SEMAGLUTIDE 3 MG/1
3 TABLET ORAL
Qty: 30 | Refills: 0 | Status: ACTIVE | OUTPATIENT
Start: 2024-02-15

## 2024-02-15 NOTE — HISTORY OF PRESENT ILLNESS
[Home] : at home, [unfilled] , at the time of the visit. [Other Location: e.g. Home (Enter Location, City,State)___] : at [unfilled] [Verbal consent obtained from patient] : the patient, [unfilled] [FreeTextEntry1] : Patient presents for weight loss and overweight/obese comorbidity management  doing well phentermine helping exercising again but zepbound was high cost and not sustainable  Due to comorbidities this patient requires medical treatment for overweight / obesity

## 2024-02-15 NOTE — ASSESSMENT
[FreeTextEntry1] : Bariatric surgery history: none Overweight / obesity comorbidities: hld  Current anti-obesity medications: phentermine Obesity medication side effects: none  -med options reviewed -coordinated getting rybelsus, start 3 mg -> 7 mg through mail order -encouraged to continue excellent PA -3 mo follow up

## 2024-02-21 DIAGNOSIS — E66.9 OBESITY, UNSPECIFIED: ICD-10-CM

## 2024-02-21 DIAGNOSIS — E78.5 HYPERLIPIDEMIA, UNSPECIFIED: ICD-10-CM

## 2024-02-22 NOTE — ED ADULT NURSE NOTE - ED CARDIAC CAPILLARY REFILL
[FreeTextEntry1] : 59 yr old with CVID, AR, CRS and mild-moderate persistent asthma - Now doing very well - recent change over to Gammagard 30 gms q 4 weeks with one infusion given at home Happy - did well Reviewed all calculations with patient Will continue IVIG Follow up for repeat Immune studies in late spring  Total MD time spent on this encounter was 35 minutes.  This includes time devoted to preparing to see the patient with review of previous medical record, obtaining medical history, performing physical exam, counseling and patient education with patient and family, ordering medications and lab studies, documentation in the medical record and coordination of care.     2 seconds or less

## 2024-02-27 NOTE — PLAN
[No] : No [Medication education provided] : Medication education provided. [Rationale for medication choices, possible risks/precautions, benefits, alternative treatment choices, and consequences of non-treatment discussed] : Rationale for medication choices, possible risks/precautions, benefits, alternative treatment choices, and consequences of non-treatment discussed with patient/family/caregiver  [FreeTextEntry5] : Psychoeducation and supportive therapy provided,  and discussed rationale for recommended med changes \par Behavior activation\par Sleep hygiene education\par Medication: continue Seroquel to 150 mg at bedtime \par Continue Lamictal 150 mg/day\par Educated patient of importance of remaining abstinent from drugs and alcohol. \par Emergency procedures were discussed: pt. educated to call 911 or go to nearest ER for worsening of symptoms/suicidal/homicidal ideation. \par individual psychotherapy to learn coping skills\par RTC in 4 weeks or earlier as needed \par Patient given opportunity to ask questions and their questions were answered and they expressed understanding and agreement with above plan.\par \par  Initial (On Arrival)

## 2024-03-14 ENCOUNTER — APPOINTMENT (OUTPATIENT)
Dept: OBGYN | Facility: CLINIC | Age: 34
End: 2024-03-14

## 2024-03-14 ENCOUNTER — TRANSCRIPTION ENCOUNTER (OUTPATIENT)
Age: 34
End: 2024-03-14

## 2024-03-21 ENCOUNTER — APPOINTMENT (OUTPATIENT)
Dept: OBGYN | Facility: CLINIC | Age: 34
End: 2024-03-21
Payer: COMMERCIAL

## 2024-03-21 VITALS
HEIGHT: 64 IN | BODY MASS INDEX: 29.19 KG/M2 | DIASTOLIC BLOOD PRESSURE: 70 MMHG | TEMPERATURE: 97.2 F | HEART RATE: 78 BPM | SYSTOLIC BLOOD PRESSURE: 110 MMHG | WEIGHT: 171 LBS | OXYGEN SATURATION: 99 %

## 2024-03-21 DIAGNOSIS — N39.3 STRESS INCONTINENCE (FEMALE) (MALE): ICD-10-CM

## 2024-03-21 DIAGNOSIS — E28.39 OTHER PRIMARY OVARIAN FAILURE: ICD-10-CM

## 2024-03-21 DIAGNOSIS — N89.8 OTHER SPECIFIED NONINFLAMMATORY DISORDERS OF VAGINA: ICD-10-CM

## 2024-03-21 LAB
BILIRUB UR QL STRIP: NEGATIVE
CLARITY UR: CLEAR
COLLECTION METHOD: NORMAL
GLUCOSE UR-MCNC: NEGATIVE
HCG UR QL: 0.2 EU/DL
HGB UR QL STRIP.AUTO: NEGATIVE
KETONES UR-MCNC: NEGATIVE
LEUKOCYTE ESTERASE UR QL STRIP: NEGATIVE
NITRITE UR QL STRIP: NEGATIVE
PH UR STRIP: 6
PROT UR STRIP-MCNC: NEGATIVE
SP GR UR STRIP: 1.02

## 2024-03-21 PROCEDURE — 99213 OFFICE O/P EST LOW 20 MIN: CPT

## 2024-03-21 RX ORDER — CLOTRIMAZOLE AND BETAMETHASONE DIPROPIONATE 10; .5 MG/G; MG/G
1-0.05 CREAM TOPICAL 3 TIMES DAILY
Qty: 1 | Refills: 0 | Status: ACTIVE | COMMUNITY
Start: 2024-03-21 | End: 1900-01-01

## 2024-03-21 NOTE — REVIEW OF SYSTEMS
[Negative] : Heme/Lymph [Urgency] : no urgency [Frequency] : no frequency [Incontinence] : incontinence [Dysuria] : no dysuria [Urethral Discharge] : no urethral discharge [Pelvic pain] : no pelvic pain [CVA Pain] : no CVA pain [Genital Rash/Irritation] : genital rash/irritation

## 2024-03-21 NOTE — PHYSICAL EXAM
[Chaperone Declined] : Patient declined chaperone [Appropriately responsive] : appropriately responsive [Alert] : alert [No Acute Distress] : no acute distress [Oriented x3] : oriented x3 [FreeTextEntry4] : Color pink, no distress, [FreeTextEntry5] : Resp. rate wnl, color pink, no SOB [Labia Majora] : normal [Labia Majora Erythema] : erythema [Labia Minora] : normal [Labia Minora Erythema] : erythema [Normal] : normal [Uterine Adnexae] : normal
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
## 2024-03-24 ENCOUNTER — NON-APPOINTMENT (OUTPATIENT)
Age: 34
End: 2024-03-24

## 2024-03-26 LAB — BACTERIA UR CULT: NORMAL

## 2024-04-15 ENCOUNTER — APPOINTMENT (OUTPATIENT)
Dept: ENDOCRINOLOGY | Facility: CLINIC | Age: 34
End: 2024-04-15

## 2024-04-24 ENCOUNTER — APPOINTMENT (OUTPATIENT)
Dept: BARIATRICS/WEIGHT MGMT | Facility: CLINIC | Age: 34
End: 2024-04-24

## 2024-04-26 RX ORDER — METFORMIN ER 500 MG 500 MG/1
500 TABLET ORAL
Qty: 90 | Refills: 0 | Status: ACTIVE | COMMUNITY
Start: 2023-10-17 | End: 1900-01-01

## 2024-05-17 NOTE — ED PROVIDER NOTE - DOMESTIC TRAVEL HIGH RISK QUESTION
Called and left a detailed message for patient that she needs to have labs done for INR. She is to have done ASAP and should be getting this drawn once a month.   
Please call Rossy.  I have refilled all medications for 1 monthw [facility] 1 refill until I see her except the warfarin.  This I only gave 1 month.  Her last INR was done IN September!  This needs to be done monthly, and I expect her to come in THIS WEEK to get her INR drawn  
Reason for call:Recent travel. History similar symptoms in past. History Bronchitis    Onset: 3 days ago    Location / description / Associated Symptoms: lungs    Precipitating Factors:Initial symptom wheezing. Dry intermittent  Cough and chest congestion. Intermittent wheezing, not heard over phone, noted when laying down. Currently on lisinopril. Requesting antibiotics    Pain Scale (1-10), 10 highest: 0/10    What improves/worsens symptoms: nothing    Symptom specific medications: scheduled meds    LMP : No LMP recorded. Patient has had a hysterectomy.    Are you pregnant or breast feeding: n/a    Recent Care: Mar 20th 2024    Did the patient have a positive coronavirus screening?: No    PLAN: 72 hour disposition, declined, requesting Z pack, call prn, verbalized understanding. Offered Walk in Care  Provider's office  See Provider within next few days    Patient/Caller refuses to follow recommendations.  Reason for Disposition   Taking an ACE Inhibitor medicine (e.g., benazepril / LOTENSIN, captopril / CAPOTEN, enalapril / VASOTEC, lisinopril / ZESTRIL)    Protocols used: Cough - Acute Non-Productive-A-AH    
No
19-Jul-2022 13:24

## 2024-05-21 ENCOUNTER — APPOINTMENT (OUTPATIENT)
Dept: GASTROENTEROLOGY | Facility: CLINIC | Age: 34
End: 2024-05-21
Payer: COMMERCIAL

## 2024-05-21 VITALS
BODY MASS INDEX: 28.68 KG/M2 | DIASTOLIC BLOOD PRESSURE: 58 MMHG | RESPIRATION RATE: 16 BRPM | OXYGEN SATURATION: 97 % | HEIGHT: 64 IN | WEIGHT: 168 LBS | SYSTOLIC BLOOD PRESSURE: 101 MMHG | TEMPERATURE: 98.2 F | HEART RATE: 69 BPM

## 2024-05-21 DIAGNOSIS — R10.9 UNSPECIFIED ABDOMINAL PAIN: ICD-10-CM

## 2024-05-21 DIAGNOSIS — E28.2 POLYCYSTIC OVARIAN SYNDROME: ICD-10-CM

## 2024-05-21 DIAGNOSIS — Z83.3 FAMILY HISTORY OF DIABETES MELLITUS: ICD-10-CM

## 2024-05-21 DIAGNOSIS — K92.1 MELENA: ICD-10-CM

## 2024-05-21 DIAGNOSIS — R10.13 EPIGASTRIC PAIN: ICD-10-CM

## 2024-05-21 PROCEDURE — 99203 OFFICE O/P NEW LOW 30 MIN: CPT

## 2024-05-21 RX ORDER — OMEPRAZOLE 20 MG/1
20 CAPSULE, DELAYED RELEASE ORAL
Qty: 30 | Refills: 0 | Status: ACTIVE | COMMUNITY
Start: 2024-05-21 | End: 1900-01-01

## 2024-05-21 NOTE — PHYSICAL EXAM
[Alert] : alert [Normal Voice/Communication] : normal voice/communication [Healthy Appearing] : healthy appearing [No Acute Distress] : no acute distress [Hearing Threshold Finger Rub Not [place]] : hearing was normal [Sclera] : the sclera and conjunctiva were normal [Normal Lips/Gums] : the lips and gums were normal [Oropharynx] : the oropharynx was normal [Normal Appearance] : the appearance of the neck was normal [No Neck Mass] : no neck mass was observed [No Respiratory Distress] : no respiratory distress [No Acc Muscle Use] : no accessory muscle use [Respiration, Rhythm And Depth] : normal respiratory rhythm and effort [Auscultation Breath Sounds / Voice Sounds] : lungs were clear to auscultation bilaterally [Heart Rate And Rhythm] : heart rate was normal and rhythm regular [Normal S1, S2] : normal S1 and S2 [Murmurs] : no murmurs [Bowel Sounds] : normal bowel sounds [Abdomen Tenderness] : non-tender [No Masses] : no abdominal mass palpated [Abdomen Soft] : soft [Oriented To Time, Place, And Person] : oriented to person, place, and time [None] : no edema [Cervical Lymph Nodes Enlarged Posterior Bilaterally] : no posterior cervical lymphadenopathy [Supraclavicular Lymph Nodes Enlarged Bilaterally] : no supraclavicular lymphadenopathy [Cervical Lymph Nodes Enlarged Anterior Bilaterally] : no anterior cervical lymphadenopathy [No Spinal Tenderness] : no spinal tenderness [Abnormal Walk] : normal gait [No Clubbing, Cyanosis] : no clubbing or cyanosis of the fingernails [Normal Color / Pigmentation] : normal skin color and pigmentation [] : no rash [No Focal Deficits] : no focal deficits

## 2024-05-21 NOTE — HISTORY OF PRESENT ILLNESS
[FreeTextEntry1] : Sharifa Pulliam is a 33-year-old black lady came for evaluation of epig pain x 3 mths, this symptom started since she took Rybelsus , which she stopped for 2 months but continues to have epigastric pain She was taking Rybelsus for the past 2 years for weight loss and insulin resistance, PCOS.  She reported significant weight loss when she  took Ryebelsus. She is a psychotherapist by profession and admits that she is going through stressful situation

## 2024-05-21 NOTE — ASSESSMENT
[FreeTextEntry1] : Sharifa is a 33-year-old black female with epigastric pain, no history of NSAID or alcohol abuse Abdominal pain may or may not be related to her medication Rybelsus However explained to her some of the GI side effects and advised upper endoscopy and abdominal sonogram for further evaluation Prescribed omeprazole

## 2024-05-23 NOTE — PLAN
[FreeTextEntry1] : Impression:  left breast mass Ms. VERDUZCO is doing well, with excellent post-operative recovery. All surgical incisions are healing well and as expected. There is no evidence of infection or complication, and she is progressing as expected.  
[FreeTextEntry1] : -UCx today -Rx lotrisone -referred to Madeline Porras  Will refill testosterone when her partner returns from deployment

## 2024-06-10 ENCOUNTER — APPOINTMENT (OUTPATIENT)
Dept: ULTRASOUND IMAGING | Facility: CLINIC | Age: 34
End: 2024-06-10
Payer: COMMERCIAL

## 2024-06-10 PROCEDURE — 76700 US EXAM ABDOM COMPLETE: CPT

## 2024-07-22 ENCOUNTER — APPOINTMENT (OUTPATIENT)
Dept: GASTROENTEROLOGY | Facility: AMBULATORY MEDICAL SERVICES | Age: 34
End: 2024-07-22
Payer: COMMERCIAL

## 2024-07-22 PROCEDURE — 43239 EGD BIOPSY SINGLE/MULTIPLE: CPT

## 2024-10-15 ENCOUNTER — EMERGENCY (EMERGENCY)
Facility: HOSPITAL | Age: 34
LOS: 1 days | Discharge: HOME | End: 2024-10-15
Attending: STUDENT IN AN ORGANIZED HEALTH CARE EDUCATION/TRAINING PROGRAM
Payer: COMMERCIAL

## 2024-10-15 VITALS
HEIGHT: 64 IN | SYSTOLIC BLOOD PRESSURE: 121 MMHG | OXYGEN SATURATION: 98 % | WEIGHT: 190.04 LBS | RESPIRATION RATE: 20 BRPM | TEMPERATURE: 98 F | DIASTOLIC BLOOD PRESSURE: 78 MMHG | HEART RATE: 91 BPM

## 2024-10-15 LAB
ALBUMIN SERPL ELPH-MCNC: 4.3 G/DL — SIGNIFICANT CHANGE UP (ref 3.3–5)
ALP SERPL-CCNC: 132 U/L — HIGH (ref 40–120)
ALT FLD-CCNC: 19 U/L — SIGNIFICANT CHANGE UP (ref 10–45)
ANION GAP SERPL CALC-SCNC: 13 MMOL/L — SIGNIFICANT CHANGE UP (ref 5–17)
APPEARANCE UR: CLEAR — SIGNIFICANT CHANGE UP
APTT BLD: 27.4 SEC — SIGNIFICANT CHANGE UP (ref 24.5–35.6)
AST SERPL-CCNC: 22 U/L — SIGNIFICANT CHANGE UP (ref 10–40)
BACTERIA # UR AUTO: ABNORMAL /HPF
BASOPHILS # BLD AUTO: 0.03 K/UL — SIGNIFICANT CHANGE UP (ref 0–0.2)
BASOPHILS NFR BLD AUTO: 0.3 % — SIGNIFICANT CHANGE UP (ref 0–2)
BILIRUB SERPL-MCNC: 0.2 MG/DL — SIGNIFICANT CHANGE UP (ref 0.2–1.2)
BILIRUB UR-MCNC: NEGATIVE — SIGNIFICANT CHANGE UP
BUN SERPL-MCNC: 17 MG/DL — SIGNIFICANT CHANGE UP (ref 7–23)
CALCIUM SERPL-MCNC: 9.7 MG/DL — SIGNIFICANT CHANGE UP (ref 8.4–10.5)
CAST: 1 /LPF — SIGNIFICANT CHANGE UP (ref 0–4)
CHLORIDE SERPL-SCNC: 104 MMOL/L — SIGNIFICANT CHANGE UP (ref 96–108)
CO2 SERPL-SCNC: 22 MMOL/L — SIGNIFICANT CHANGE UP (ref 22–31)
COLOR SPEC: YELLOW — SIGNIFICANT CHANGE UP
CREAT SERPL-MCNC: 0.91 MG/DL — SIGNIFICANT CHANGE UP (ref 0.5–1.3)
DIFF PNL FLD: NEGATIVE — SIGNIFICANT CHANGE UP
EGFR: 84 ML/MIN/1.73M2 — SIGNIFICANT CHANGE UP
EOSINOPHIL # BLD AUTO: 0.61 K/UL — HIGH (ref 0–0.5)
EOSINOPHIL NFR BLD AUTO: 7.1 % — HIGH (ref 0–6)
GLUCOSE SERPL-MCNC: 88 MG/DL — SIGNIFICANT CHANGE UP (ref 70–99)
GLUCOSE UR QL: NEGATIVE MG/DL — SIGNIFICANT CHANGE UP
HCG SERPL-ACNC: <2 MIU/ML — SIGNIFICANT CHANGE UP
HCT VFR BLD CALC: 39.5 % — SIGNIFICANT CHANGE UP (ref 34.5–45)
HGB BLD-MCNC: 12.9 G/DL — SIGNIFICANT CHANGE UP (ref 11.5–15.5)
IMM GRANULOCYTES NFR BLD AUTO: 0.2 % — SIGNIFICANT CHANGE UP (ref 0–0.9)
INR BLD: 0.96 RATIO — SIGNIFICANT CHANGE UP (ref 0.85–1.16)
KETONES UR-MCNC: NEGATIVE MG/DL — SIGNIFICANT CHANGE UP
LEUKOCYTE ESTERASE UR-ACNC: ABNORMAL
LYMPHOCYTES # BLD AUTO: 2.82 K/UL — SIGNIFICANT CHANGE UP (ref 1–3.3)
LYMPHOCYTES # BLD AUTO: 32.9 % — SIGNIFICANT CHANGE UP (ref 13–44)
MCHC RBC-ENTMCNC: 30.6 PG — SIGNIFICANT CHANGE UP (ref 27–34)
MCHC RBC-ENTMCNC: 32.7 GM/DL — SIGNIFICANT CHANGE UP (ref 32–36)
MCV RBC AUTO: 93.8 FL — SIGNIFICANT CHANGE UP (ref 80–100)
MONOCYTES # BLD AUTO: 0.87 K/UL — SIGNIFICANT CHANGE UP (ref 0–0.9)
MONOCYTES NFR BLD AUTO: 10.1 % — SIGNIFICANT CHANGE UP (ref 2–14)
NEUTROPHILS # BLD AUTO: 4.23 K/UL — SIGNIFICANT CHANGE UP (ref 1.8–7.4)
NEUTROPHILS NFR BLD AUTO: 49.4 % — SIGNIFICANT CHANGE UP (ref 43–77)
NITRITE UR-MCNC: NEGATIVE — SIGNIFICANT CHANGE UP
NRBC # BLD: 0 /100 WBCS — SIGNIFICANT CHANGE UP (ref 0–0)
NT-PROBNP SERPL-SCNC: 102 PG/ML — SIGNIFICANT CHANGE UP (ref 0–300)
PH UR: 6 — SIGNIFICANT CHANGE UP (ref 5–8)
PLATELET # BLD AUTO: 281 K/UL — SIGNIFICANT CHANGE UP (ref 150–400)
POTASSIUM SERPL-MCNC: 3.9 MMOL/L — SIGNIFICANT CHANGE UP (ref 3.5–5.3)
POTASSIUM SERPL-SCNC: 3.9 MMOL/L — SIGNIFICANT CHANGE UP (ref 3.5–5.3)
PROT SERPL-MCNC: 7.4 G/DL — SIGNIFICANT CHANGE UP (ref 6–8.3)
PROT UR-MCNC: NEGATIVE MG/DL — SIGNIFICANT CHANGE UP
PROTHROM AB SERPL-ACNC: 11 SEC — SIGNIFICANT CHANGE UP (ref 9.9–13.4)
RBC # BLD: 4.21 M/UL — SIGNIFICANT CHANGE UP (ref 3.8–5.2)
RBC # FLD: 13.1 % — SIGNIFICANT CHANGE UP (ref 10.3–14.5)
RBC CASTS # UR COMP ASSIST: 0 /HPF — SIGNIFICANT CHANGE UP (ref 0–4)
SODIUM SERPL-SCNC: 139 MMOL/L — SIGNIFICANT CHANGE UP (ref 135–145)
SP GR SPEC: 1.01 — SIGNIFICANT CHANGE UP (ref 1–1.03)
SQUAMOUS # UR AUTO: 9 /HPF — HIGH (ref 0–5)
TROPONIN T, HIGH SENSITIVITY RESULT: <6 NG/L — SIGNIFICANT CHANGE UP (ref 0–51)
UROBILINOGEN FLD QL: 0.2 MG/DL — SIGNIFICANT CHANGE UP (ref 0.2–1)
WBC # BLD: 8.58 K/UL — SIGNIFICANT CHANGE UP (ref 3.8–10.5)
WBC # FLD AUTO: 8.58 K/UL — SIGNIFICANT CHANGE UP (ref 3.8–10.5)
WBC UR QL: 9 /HPF — HIGH (ref 0–5)

## 2024-10-15 PROCEDURE — 99284 EMERGENCY DEPT VISIT MOD MDM: CPT

## 2024-10-15 PROCEDURE — 84484 ASSAY OF TROPONIN QUANT: CPT

## 2024-10-15 PROCEDURE — 93005 ELECTROCARDIOGRAM TRACING: CPT

## 2024-10-15 PROCEDURE — 80053 COMPREHEN METABOLIC PANEL: CPT

## 2024-10-15 PROCEDURE — 85025 COMPLETE CBC W/AUTO DIFF WBC: CPT

## 2024-10-15 PROCEDURE — 82550 ASSAY OF CK (CPK): CPT

## 2024-10-15 PROCEDURE — 85730 THROMBOPLASTIN TIME PARTIAL: CPT

## 2024-10-15 PROCEDURE — 87086 URINE CULTURE/COLONY COUNT: CPT

## 2024-10-15 PROCEDURE — 93970 EXTREMITY STUDY: CPT

## 2024-10-15 PROCEDURE — 84702 CHORIONIC GONADOTROPIN TEST: CPT

## 2024-10-15 PROCEDURE — 99285 EMERGENCY DEPT VISIT HI MDM: CPT | Mod: 25

## 2024-10-15 PROCEDURE — 36000 PLACE NEEDLE IN VEIN: CPT

## 2024-10-15 PROCEDURE — 81001 URINALYSIS AUTO W/SCOPE: CPT

## 2024-10-15 PROCEDURE — 85610 PROTHROMBIN TIME: CPT

## 2024-10-15 PROCEDURE — 83880 ASSAY OF NATRIURETIC PEPTIDE: CPT

## 2024-10-15 PROCEDURE — 93970 EXTREMITY STUDY: CPT | Mod: 26

## 2024-10-15 RX ORDER — SODIUM CHLORIDE 0.9 % (FLUSH) 0.9 %
1000 SYRINGE (ML) INJECTION ONCE
Refills: 0 | Status: DISCONTINUED | OUTPATIENT
Start: 2024-10-15 | End: 2024-10-15

## 2024-10-15 NOTE — ED PROVIDER NOTE - CLINICAL SUMMARY MEDICAL DECISION MAKING FREE TEXT BOX
33 y.o. hx. PCOS, insulin resistance, on OCP presenting w/ worsening b/l LE edema, pain worse on the LLE. HPI concerning for DVT. Cardiac etiology less likely. Doppler US of b/l LE, pro-BNP, Trop. Patient also presenting with urinary symptoms, polyuria, polydipsia, ammonia smell to urine. Possible UTI. Ordered CBC, CMP, UA. 33 y.o. hx. PCOS, insulin resistance, on OCP presenting w/ worsening b/l LE edema, pain worse on the LLE. HPI concerning for DVT. Cardiac etiology less likely. Doppler US of b/l LE, pro-BNP, Trop. Patient also presenting with urinary symptoms, polyuria, polydipsia, ammonia smell to urine. Possible UTI. Ordered CBC, CMP, UA. Doppler U/S LE negative for clots.

## 2024-10-15 NOTE — ED ADULT NURSE REASSESSMENT NOTE - NS ED NURSE REASSESS COMMENT FT1
Report received from Hillary Cutler and fellow, RN. Pt AAOx4, NAD, resp nonlabored, skin warm/dry, resting comfortably in bed with family at bedside. Pt denies headache, dizziness, chest pain, palpitations, SOB, abd pain, n/v/d, urinary symptoms, fevers, chills, weakness at this time. Pt awaiting dispo. Safety maintained.

## 2024-10-15 NOTE — ED PROVIDER NOTE - OBJECTIVE STATEMENT
33 y.o. female PMH PCOS, unknown lesion on one of the kidneys, on OCP Aurovela presents to the ED complaining of  b/l lower extremity edema x 1month with progressively worsening pain b/l LE, worse on the left x 2 days. Patient states the pain is in the worse with ambulation, gets a little better with cold shower. Patient denies recent travel, long car rides, surgery, SOB, chest pain, hemoptysis, fever, chills. Patient is also endorsing odd smell to urine, "smells like ammonia." She endorses frequent urination, polydipsia, vaginal itchiness, pelvic pain. Denies dysuria, vaginal discharge, vaginal bleeding. Patient has had UTIs in the past. Allergy to macrobid. Denies hx. of STI.

## 2024-10-15 NOTE — ED ADULT NURSE NOTE - HOW MANY DRINKS CONTAINING ALCOHOL DO YOU HAVE ON A TYPICAL DAY WHEN YOU ARE DRINKING?
Pt presents to the ED with c/o SOB and increased swelling in the legs bilaterally. Pt states a weight gain of 10lbs over a couple weeks. Pt is A&Ox 3.    1 or 2

## 2024-10-15 NOTE — ED ADULT TRIAGE NOTE - CHIEF COMPLAINT QUOTE
Swelling of lower extremities worsening x 3 days. Reports frequent urination with "the smell of ammonia."

## 2024-10-15 NOTE — ED PROVIDER NOTE - RAPID ASSESSMENT
33-year-old female PMH PCOS, unknown lesion on one of the kidneys, presents to the ED complaining of progressively worsening lower extremity edema over the past month, reports over the past 3 to 4 days acutely worsening edema in the left lower leg with associated pain and difficulty ambulating.  Patient also reports strong "ammonia sent" to the urine for the past 3 days.  Patient notes some frothy urine.  Patient denies fevers, chills, hematuria, abdominal pain, chest pain, shortness of breath./ SIGALA.  [phone]  Patient was seen as a rapid assessment (QPA) patient. This is an incomplete workup and it is intended that the patient will be seen in the main emergency department. The patient will be seen and further worked up in the main emergency department and their care will be completed by the main emergency department team along with a thorough physical exam. Receiving team will follow up on labs, analgesia, any clinical imaging, reassess and disposition as clinically indicated, all decisions regarding the progression of care will be made at their discretion. - Dago Sandoval PA-C 33-year-old female PMH PCOS, unknown lesion on one of the kidneys, presents to the ED complaining of progressively worsening lower extremity edema over the past month, reports over the past 3 to 4 days acutely worsening edema in the left lower leg with associated pain and difficulty ambulating.  Patient also reports strong "ammonia sent" to the urine for the past 3 days.  Patient notes some frothy urine.  Patient denies fevers, chills, hematuria, abdominal pain, chest pain, shortness of breath./ SIGALA.  [phone]  Patient was seen as a rapid assessment (QPA) patient. This is an incomplete workup and it is intended that the patient will be seen in the main emergency department. The patient will be seen and further worked up in the main emergency department and their care will be completed by the main emergency department team along with a thorough physical exam. Receiving team will follow up on labs, analgesia, any clinical imaging, reassess and disposition as clinically indicated, all decisions regarding the progression of care will be made at their discretion. - Dago Sandoval PA-C    Attending MD Vásquez: This patient was seen and orders were placed by the PA as per our department's QPA model.  I was not consulted in regards to this patient although I was present and available in the Emergency Department to the PA.  Patient was to be sent to main ED for full medical evaluation and receiving team was to follow up on any labs, analgesia, clinical imaging ordered by the PA.  Any reassessment and disposition decisions were to be made by receiving team as clinically indicated, all decisions regarding the progression of care to be made at their discretion.  I did not perform a comprehensive history and physical on this patient.

## 2024-10-15 NOTE — ED ADULT NURSE NOTE - NSFALLRISKINTERV_ED_ALL_ED

## 2024-10-15 NOTE — ED PROVIDER NOTE - NSFOLLOWUPINSTRUCTIONS_ED_ALL_ED_FT
LEG SWELLING     WHAT YOU NEED TO KNOW:    Leg edema is swelling caused by fluid buildup. Your legs may swell if you sit or stand for long periods of time, are pregnant, or are injured. Swelling may also occur if you have heart failure or circulation problems.       DISCHARGE INSTRUCTIONS:  Please follow up with your primary care doctor in 1-2 weeks.     Self-care:   Elevate your legs: Raise your legs above the level of your heart as often as you can. This will help decrease swelling and pain. Prop your legs on pillows or blankets to keep them elevated comfortably.  Wear pressure stockings: These tight stockings put pressure on your legs to promote blood flow and prevent blood clots. Wear the stockings during the day. Do not wear them while you sleep.  Apply heat: Heat helps decrease pain and swelling. Apply heat on the area for 20 to 30 minutes every 2 hours for as many days as directed.   Stay active: Do not stand or sit for long periods of time. Ask your healthcare provider about the best exercise plan for you.  Eat healthy foods: Healthy foods include fruits, vegetables, whole-grain breads, low-fat dairy products, beans, lean meats, and fish. Ask if you need to be on a special diet. Limit salt. Salt will make your body hold even more fluid.    Follow up with your healthcare provider as directed: Write down your questions so you remember to ask them during your visits.     Contact your healthcare provider if:   You have a fever or feel more tired than usual.  The veins in your legs look larger than usual. They may look full or bulging.  Your legs itch or feel heavy.  You have red or white areas or sores on your legs. The skin may also appear dimpled or have indentations.  You are gaining weight.  You have trouble moving your ankles.  The swelling does not go away, or other parts of your body swell.  You have questions or concerns about your condition or care.    Return to the emergency department if:   You cannot walk.  You feel faint or confused.   Your skin turns blue or gray.  Your leg feels warm, tender, and painful. It may be swollen and red.  You have chest pain or trouble breathing that is worse when you lie down.  You suddenly feel lightheaded and have trouble breathing.  You have new and sudden chest pain. You may have more pain when you take deep breaths or cough. You may also cough up blood.

## 2024-10-15 NOTE — ED PROVIDER NOTE - ATTENDING CONTRIBUTION TO CARE
I, Dr. Sandra Agosto, have personally performed a face to face medical and diagnostic evaluation of the patient. I have discussed with and reviewed the Resident's and/or ACP's and/or Medical/PA/NP student's note and agree with the History, ROS, Physical Exam and MDM unless otherwise indicated. A brief summary of my personal evaluation and impression can be found below.     HPI: see above.     PE: Well appearing, clear lungs, normal heart sounds, no appreciable leg edema, ranging lower extremity throughout, no knee joint swelling, peripheral pulses intact, abd soft, nt/nd. Compartments are soft.     MDM: Patient with PCOS on OCPs presenting with concern for leg swelling. Also with abnormal urine smell. Edema not appreciated on exam but states they appear enlarged. Possible DVT but less likely to cause bilateral symptoms. Doubt fluid overload as not with respiratory symptoms or risk factors. Possible mild venous insufficiency. Urine seems unrelated but could also be part of a nephrotic syndrome? Will assess with labs, urine studies, DVT us and reassess.

## 2024-10-15 NOTE — ED ADULT NURSE NOTE - OBJECTIVE STATEMENT
33 year old female presented to the ED AOx4 with bilateral leg swelling, left leg pain rated a 6 that is constant and feels like is "squeezing" and urination that is yellow cloudy foamy and smells like "ammonia" for the past three months and all of these symptoms have increased in the past 3 days. Pt uses a cane to help walking with the left leg pain. Pt denies sob, chills, fever, chest pain, pain with urination, incontinence, dysuria, NVD. Patient in gown, in stretcher, questions answered and education provided.

## 2024-10-15 NOTE — ED PROVIDER NOTE - PATIENT PORTAL LINK FT
90 You can access the FollowMyHealth Patient Portal offered by [facility] by registering at the following website: [email or web address]. By joining [facility]’s FollowMyHealth portal, you will also be able to view your health information using other applications (apps) compatible with our system.

## 2024-10-16 VITALS
HEART RATE: 81 BPM | RESPIRATION RATE: 16 BRPM | DIASTOLIC BLOOD PRESSURE: 68 MMHG | SYSTOLIC BLOOD PRESSURE: 107 MMHG | OXYGEN SATURATION: 98 % | TEMPERATURE: 98 F

## 2024-10-16 LAB — ADD ON TEST-SPECIMEN IN LAB: SIGNIFICANT CHANGE UP

## 2024-10-17 LAB
CULTURE RESULTS: SIGNIFICANT CHANGE UP
SPECIMEN SOURCE: SIGNIFICANT CHANGE UP

## 2024-12-12 ENCOUNTER — OUTPATIENT (OUTPATIENT)
Dept: OUTPATIENT SERVICES | Facility: HOSPITAL | Age: 34
LOS: 1 days | End: 2024-12-12
Payer: COMMERCIAL

## 2024-12-12 ENCOUNTER — APPOINTMENT (OUTPATIENT)
Dept: BARIATRICS/WEIGHT MGMT | Facility: CLINIC | Age: 34
End: 2024-12-12
Payer: COMMERCIAL

## 2024-12-12 DIAGNOSIS — E66.811 OBESITY, CLASS 1: ICD-10-CM

## 2024-12-12 DIAGNOSIS — E78.5 HYPERLIPIDEMIA, UNSPECIFIED: ICD-10-CM

## 2024-12-12 PROCEDURE — 99214 OFFICE O/P EST MOD 30 MIN: CPT | Mod: 95

## 2024-12-12 PROCEDURE — G0463: CPT

## 2024-12-13 DIAGNOSIS — I10 ESSENTIAL (PRIMARY) HYPERTENSION: ICD-10-CM

## 2024-12-13 PROBLEM — E66.811 CLASS 1 OBESITY: Status: ACTIVE | Noted: 2020-09-10

## 2024-12-13 RX ORDER — SEMAGLUTIDE 0.68 MG/ML
2 INJECTION, SOLUTION SUBCUTANEOUS
Qty: 1 | Refills: 5 | Status: ACTIVE | OUTPATIENT
Start: 2024-12-13

## 2024-12-13 RX ORDER — SEMAGLUTIDE 0.68 MG/ML
2 INJECTION, SOLUTION SUBCUTANEOUS
Qty: 3 | Refills: 0 | Status: ACTIVE | OUTPATIENT
Start: 2024-12-13

## 2024-12-13 RX ORDER — ORAL SEMAGLUTIDE 7 MG/1
7 TABLET ORAL
Qty: 30 | Refills: 5 | Status: ACTIVE | OUTPATIENT
Start: 2024-12-13

## 2024-12-16 DIAGNOSIS — E78.5 HYPERLIPIDEMIA, UNSPECIFIED: ICD-10-CM

## 2024-12-16 RX ORDER — ORAL SEMAGLUTIDE 3 MG/1
3 TABLET ORAL
Qty: 30 | Refills: 1 | Status: ACTIVE | OUTPATIENT
Start: 2024-12-13

## 2024-12-31 ENCOUNTER — APPOINTMENT (OUTPATIENT)
Dept: OBGYN | Facility: CLINIC | Age: 34
End: 2024-12-31
Payer: COMMERCIAL

## 2024-12-31 VITALS
DIASTOLIC BLOOD PRESSURE: 76 MMHG | BODY MASS INDEX: 31.58 KG/M2 | SYSTOLIC BLOOD PRESSURE: 118 MMHG | TEMPERATURE: 97.6 F | HEART RATE: 71 BPM | OXYGEN SATURATION: 96 % | HEIGHT: 64 IN | WEIGHT: 185 LBS

## 2024-12-31 DIAGNOSIS — Z87.42 PERSONAL HISTORY OF OTHER DISEASES OF THE FEMALE GENITAL TRACT: ICD-10-CM

## 2024-12-31 DIAGNOSIS — Z20.2 CONTACT WITH AND (SUSPECTED) EXPOSURE TO INFECTIONS WITH A PREDOMINANTLY SEXUAL MODE OF TRANSMISSION: ICD-10-CM

## 2024-12-31 DIAGNOSIS — N89.8 OTHER SPECIFIED NONINFLAMMATORY DISORDERS OF VAGINA: ICD-10-CM

## 2024-12-31 DIAGNOSIS — Z11.51 ENCOUNTER FOR SCREENING FOR HUMAN PAPILLOMAVIRUS (HPV): ICD-10-CM

## 2024-12-31 DIAGNOSIS — N76.0 ACUTE VAGINITIS: ICD-10-CM

## 2024-12-31 DIAGNOSIS — B96.89 ACUTE VAGINITIS: ICD-10-CM

## 2024-12-31 DIAGNOSIS — E28.39 OTHER PRIMARY OVARIAN FAILURE: ICD-10-CM

## 2024-12-31 DIAGNOSIS — R68.82 DECREASED LIBIDO: ICD-10-CM

## 2024-12-31 DIAGNOSIS — R30.0 DYSURIA: ICD-10-CM

## 2024-12-31 DIAGNOSIS — Z01.419 ENCOUNTER FOR GYNECOLOGICAL EXAMINATION (GENERAL) (ROUTINE) W/OUT ABNORMAL FINDINGS: ICD-10-CM

## 2024-12-31 PROCEDURE — 99395 PREV VISIT EST AGE 18-39: CPT

## 2024-12-31 RX ORDER — NORETHINDRONE ACETATE AND ETHINYL ESTRADIOL 1.5; 3 MG/1; UG/1
1.5-3 TABLET ORAL
Qty: 84 | Refills: 3 | Status: ACTIVE | COMMUNITY
Start: 2024-12-31 | End: 1900-01-01

## 2025-01-02 LAB
BACTERIA UR CULT: NORMAL
CANDIDA VAG CYTO: NOT DETECTED
G VAGINALIS+PREV SP MTYP VAG QL MICRO: NOT DETECTED
HBV SURFACE AG SER QL: NONREACTIVE
HCV AB SER QL: NONREACTIVE
HCV S/CO RATIO: 0.07 S/CO
HIV1+2 AB SPEC QL IA.RAPID: NONREACTIVE
T PALLIDUM AB SER QL IA: NEGATIVE
T VAGINALIS VAG QL WET PREP: NOT DETECTED

## 2025-01-03 LAB — HPV HIGH+LOW RISK DNA PNL CVX: NOT DETECTED

## 2025-01-04 LAB
C TRACH RRNA SPEC QL NAA+PROBE: NOT DETECTED
N GONORRHOEA RRNA SPEC QL NAA+PROBE: NOT DETECTED
SOURCE TP AMPLIFICATION: NORMAL

## 2025-01-07 LAB — CYTOLOGY CVX/VAG DOC THIN PREP: NORMAL

## 2025-03-03 ENCOUNTER — APPOINTMENT (OUTPATIENT)
Dept: OBGYN | Facility: CLINIC | Age: 35
End: 2025-03-03
Payer: COMMERCIAL

## 2025-03-03 VITALS
TEMPERATURE: 97.6 F | WEIGHT: 185 LBS | DIASTOLIC BLOOD PRESSURE: 78 MMHG | HEIGHT: 64 IN | OXYGEN SATURATION: 95 % | HEART RATE: 73 BPM | BODY MASS INDEX: 31.58 KG/M2 | SYSTOLIC BLOOD PRESSURE: 116 MMHG

## 2025-03-03 DIAGNOSIS — N64.4 MASTODYNIA: ICD-10-CM

## 2025-03-03 LAB
HCG UR QL: NEGATIVE
QUALITY CONTROL: YES

## 2025-03-03 PROCEDURE — 99213 OFFICE O/P EST LOW 20 MIN: CPT

## 2025-03-21 ENCOUNTER — RESULT REVIEW (OUTPATIENT)
Age: 35
End: 2025-03-21

## 2025-03-21 ENCOUNTER — OUTPATIENT (OUTPATIENT)
Dept: OUTPATIENT SERVICES | Facility: HOSPITAL | Age: 35
LOS: 1 days | End: 2025-03-21
Payer: COMMERCIAL

## 2025-03-21 ENCOUNTER — APPOINTMENT (OUTPATIENT)
Dept: ULTRASOUND IMAGING | Facility: CLINIC | Age: 35
End: 2025-03-21
Payer: COMMERCIAL

## 2025-03-21 DIAGNOSIS — N64.4 MASTODYNIA: ICD-10-CM

## 2025-03-21 PROCEDURE — 76642 ULTRASOUND BREAST LIMITED: CPT

## 2025-03-21 PROCEDURE — 76642 ULTRASOUND BREAST LIMITED: CPT | Mod: 26,LT

## 2025-05-23 ENCOUNTER — APPOINTMENT (OUTPATIENT)
Dept: BARIATRICS/WEIGHT MGMT | Facility: CLINIC | Age: 35
End: 2025-05-23

## 2025-05-23 DIAGNOSIS — E78.5 HYPERLIPIDEMIA, UNSPECIFIED: ICD-10-CM

## 2025-05-23 DIAGNOSIS — E66.811 OBESITY, CLASS 1: ICD-10-CM

## 2025-05-23 PROCEDURE — 99213 OFFICE O/P EST LOW 20 MIN: CPT | Mod: 95

## 2025-06-23 ENCOUNTER — NON-APPOINTMENT (OUTPATIENT)
Age: 35
End: 2025-06-23

## 2025-07-22 ENCOUNTER — NON-APPOINTMENT (OUTPATIENT)
Age: 35
End: 2025-07-22

## 2025-09-14 ENCOUNTER — NON-APPOINTMENT (OUTPATIENT)
Age: 35
End: 2025-09-14